# Patient Record
Sex: FEMALE | Race: OTHER | HISPANIC OR LATINO | URBAN - METROPOLITAN AREA
[De-identification: names, ages, dates, MRNs, and addresses within clinical notes are randomized per-mention and may not be internally consistent; named-entity substitution may affect disease eponyms.]

---

## 2020-10-28 ENCOUNTER — INPATIENT (INPATIENT)
Facility: HOSPITAL | Age: 85
LOS: 5 days | Discharge: HOME HEALTH | End: 2020-11-03
Attending: HOSPITALIST | Admitting: HOSPITALIST
Payer: MEDICARE

## 2020-10-28 VITALS
SYSTOLIC BLOOD PRESSURE: 146 MMHG | OXYGEN SATURATION: 99 % | DIASTOLIC BLOOD PRESSURE: 86 MMHG | HEART RATE: 104 BPM | HEIGHT: 63 IN | TEMPERATURE: 98 F | RESPIRATION RATE: 16 BRPM

## 2020-10-28 DIAGNOSIS — R06.00 DYSPNEA, UNSPECIFIED: ICD-10-CM

## 2020-10-28 DIAGNOSIS — Z98.890 OTHER SPECIFIED POSTPROCEDURAL STATES: Chronic | ICD-10-CM

## 2020-10-28 DIAGNOSIS — D62 ACUTE POSTHEMORRHAGIC ANEMIA: ICD-10-CM

## 2020-10-28 DIAGNOSIS — M06.9 RHEUMATOID ARTHRITIS, UNSPECIFIED: ICD-10-CM

## 2020-10-28 DIAGNOSIS — Z02.9 ENCOUNTER FOR ADMINISTRATIVE EXAMINATIONS, UNSPECIFIED: ICD-10-CM

## 2020-10-28 DIAGNOSIS — K92.2 GASTROINTESTINAL HEMORRHAGE, UNSPECIFIED: ICD-10-CM

## 2020-10-28 DIAGNOSIS — Z29.9 ENCOUNTER FOR PROPHYLACTIC MEASURES, UNSPECIFIED: ICD-10-CM

## 2020-10-28 DIAGNOSIS — R74.02 ELEVATION OF LEVELS OF LACTIC ACID DEHYDROGENASE [LDH]: ICD-10-CM

## 2020-10-28 LAB
ALBUMIN SERPL ELPH-MCNC: 4.4 G/DL — SIGNIFICANT CHANGE UP (ref 3.3–5)
ALP SERPL-CCNC: 77 U/L — SIGNIFICANT CHANGE UP (ref 40–120)
ALT FLD-CCNC: 7 U/L — SIGNIFICANT CHANGE UP (ref 4–33)
ANION GAP SERPL CALC-SCNC: 11 MMO/L — SIGNIFICANT CHANGE UP (ref 7–14)
APTT BLD: 33.2 SEC — SIGNIFICANT CHANGE UP (ref 27–36.3)
AST SERPL-CCNC: 17 U/L — SIGNIFICANT CHANGE UP (ref 4–32)
BASE EXCESS BLDV CALC-SCNC: 5 MMOL/L — SIGNIFICANT CHANGE UP
BASOPHILS # BLD AUTO: 0.03 K/UL — SIGNIFICANT CHANGE UP (ref 0–0.2)
BASOPHILS NFR BLD AUTO: 0.4 % — SIGNIFICANT CHANGE UP (ref 0–2)
BILIRUB SERPL-MCNC: 0.3 MG/DL — SIGNIFICANT CHANGE UP (ref 0.2–1.2)
BLD GP AB SCN SERPL QL: NEGATIVE — SIGNIFICANT CHANGE UP
BLOOD GAS VENOUS - CREATININE: 0.58 MG/DL — SIGNIFICANT CHANGE UP (ref 0.5–1.3)
BUN SERPL-MCNC: 14 MG/DL — SIGNIFICANT CHANGE UP (ref 7–23)
CALCIUM SERPL-MCNC: 9.4 MG/DL — SIGNIFICANT CHANGE UP (ref 8.4–10.5)
CHLORIDE BLDV-SCNC: 104 MMOL/L — SIGNIFICANT CHANGE UP (ref 96–108)
CHLORIDE SERPL-SCNC: 102 MMOL/L — SIGNIFICANT CHANGE UP (ref 98–107)
CO2 SERPL-SCNC: 30 MMOL/L — SIGNIFICANT CHANGE UP (ref 22–31)
CREAT SERPL-MCNC: 0.57 MG/DL — SIGNIFICANT CHANGE UP (ref 0.5–1.3)
EOSINOPHIL # BLD AUTO: 0.02 K/UL — SIGNIFICANT CHANGE UP (ref 0–0.5)
EOSINOPHIL NFR BLD AUTO: 0.3 % — SIGNIFICANT CHANGE UP (ref 0–6)
GAS PNL BLDV: 141 MMOL/L — SIGNIFICANT CHANGE UP (ref 136–146)
GLUCOSE BLDV-MCNC: 100 MG/DL — HIGH (ref 70–99)
GLUCOSE SERPL-MCNC: 104 MG/DL — HIGH (ref 70–99)
HCO3 BLDV-SCNC: 27 MMOL/L — SIGNIFICANT CHANGE UP (ref 20–27)
HCT VFR BLD CALC: 29.8 % — LOW (ref 34.5–45)
HCT VFR BLD CALC: 31.4 % — LOW (ref 34.5–45)
HCT VFR BLDV CALC: 31.5 % — LOW (ref 34.5–45)
HGB BLD-MCNC: 9.3 G/DL — LOW (ref 11.5–15.5)
HGB BLD-MCNC: 9.6 G/DL — LOW (ref 11.5–15.5)
HGB BLDV-MCNC: 10.2 G/DL — LOW (ref 11.5–15.5)
IMM GRANULOCYTES NFR BLD AUTO: 0.4 % — SIGNIFICANT CHANGE UP (ref 0–1.5)
INR BLD: 1.06 — SIGNIFICANT CHANGE UP (ref 0.88–1.16)
LACTATE BLDV-MCNC: 2.2 MMOL/L — HIGH (ref 0.5–2)
LYMPHOCYTES # BLD AUTO: 2.18 K/UL — SIGNIFICANT CHANGE UP (ref 1–3.3)
LYMPHOCYTES # BLD AUTO: 31.6 % — SIGNIFICANT CHANGE UP (ref 13–44)
MCHC RBC-ENTMCNC: 23.5 PG — LOW (ref 27–34)
MCHC RBC-ENTMCNC: 24.2 PG — LOW (ref 27–34)
MCHC RBC-ENTMCNC: 30.6 % — LOW (ref 32–36)
MCHC RBC-ENTMCNC: 31.2 % — LOW (ref 32–36)
MCV RBC AUTO: 76.8 FL — LOW (ref 80–100)
MCV RBC AUTO: 77.6 FL — LOW (ref 80–100)
MONOCYTES # BLD AUTO: 0.75 K/UL — SIGNIFICANT CHANGE UP (ref 0–0.9)
MONOCYTES NFR BLD AUTO: 10.9 % — SIGNIFICANT CHANGE UP (ref 2–14)
NEUTROPHILS # BLD AUTO: 3.89 K/UL — SIGNIFICANT CHANGE UP (ref 1.8–7.4)
NEUTROPHILS NFR BLD AUTO: 56.4 % — SIGNIFICANT CHANGE UP (ref 43–77)
NRBC # FLD: 0 K/UL — SIGNIFICANT CHANGE UP (ref 0–0)
NRBC # FLD: 0 K/UL — SIGNIFICANT CHANGE UP (ref 0–0)
NT-PROBNP SERPL-SCNC: 133.4 PG/ML — SIGNIFICANT CHANGE UP
OB PNL STL: POSITIVE — SIGNIFICANT CHANGE UP
PCO2 BLDV: 55 MMHG — HIGH (ref 41–51)
PH BLDV: 7.36 PH — SIGNIFICANT CHANGE UP (ref 7.32–7.43)
PLATELET # BLD AUTO: 262 K/UL — SIGNIFICANT CHANGE UP (ref 150–400)
PLATELET # BLD AUTO: 290 K/UL — SIGNIFICANT CHANGE UP (ref 150–400)
PMV BLD: 10.6 FL — SIGNIFICANT CHANGE UP (ref 7–13)
PMV BLD: 10.8 FL — SIGNIFICANT CHANGE UP (ref 7–13)
PO2 BLDV: 22 MMHG — LOW (ref 35–40)
POTASSIUM BLDV-SCNC: 5.4 MMOL/L — HIGH (ref 3.4–4.5)
POTASSIUM SERPL-MCNC: 4.2 MMOL/L — SIGNIFICANT CHANGE UP (ref 3.5–5.3)
POTASSIUM SERPL-SCNC: 4.2 MMOL/L — SIGNIFICANT CHANGE UP (ref 3.5–5.3)
PROT SERPL-MCNC: 7.7 G/DL — SIGNIFICANT CHANGE UP (ref 6–8.3)
PROTHROM AB SERPL-ACNC: 12 SEC — SIGNIFICANT CHANGE UP (ref 10.6–13.6)
RBC # BLD: 3.84 M/UL — SIGNIFICANT CHANGE UP (ref 3.8–5.2)
RBC # BLD: 4.09 M/UL — SIGNIFICANT CHANGE UP (ref 3.8–5.2)
RBC # FLD: 14.6 % — HIGH (ref 10.3–14.5)
RBC # FLD: 14.8 % — HIGH (ref 10.3–14.5)
RH IG SCN BLD-IMP: POSITIVE — SIGNIFICANT CHANGE UP
RH IG SCN BLD-IMP: POSITIVE — SIGNIFICANT CHANGE UP
SAO2 % BLDV: 29.7 % — LOW (ref 60–85)
SODIUM SERPL-SCNC: 143 MMOL/L — SIGNIFICANT CHANGE UP (ref 135–145)
WBC # BLD: 6.6 K/UL — SIGNIFICANT CHANGE UP (ref 3.8–10.5)
WBC # BLD: 6.9 K/UL — SIGNIFICANT CHANGE UP (ref 3.8–10.5)
WBC # FLD AUTO: 6.6 K/UL — SIGNIFICANT CHANGE UP (ref 3.8–10.5)
WBC # FLD AUTO: 6.9 K/UL — SIGNIFICANT CHANGE UP (ref 3.8–10.5)

## 2020-10-28 PROCEDURE — 71046 X-RAY EXAM CHEST 2 VIEWS: CPT | Mod: 26

## 2020-10-28 PROCEDURE — 99223 1ST HOSP IP/OBS HIGH 75: CPT

## 2020-10-28 PROCEDURE — 99284 EMERGENCY DEPT VISIT MOD MDM: CPT

## 2020-10-28 RX ORDER — PANTOPRAZOLE SODIUM 20 MG/1
8 TABLET, DELAYED RELEASE ORAL
Qty: 80 | Refills: 0 | Status: DISCONTINUED | OUTPATIENT
Start: 2020-10-28 | End: 2020-10-29

## 2020-10-28 RX ORDER — ACETAMINOPHEN 500 MG
2 TABLET ORAL
Qty: 0 | Refills: 0 | DISCHARGE

## 2020-10-28 RX ORDER — PETROLATUM,WHITE
1 JELLY (GRAM) TOPICAL
Qty: 0 | Refills: 0 | DISCHARGE

## 2020-10-28 RX ORDER — FUROSEMIDE 40 MG
1 TABLET ORAL
Qty: 0 | Refills: 0 | DISCHARGE

## 2020-10-28 RX ORDER — PETROLATUM,WHITE
1 JELLY (GRAM) TOPICAL DAILY
Refills: 0 | Status: DISCONTINUED | OUTPATIENT
Start: 2020-10-28 | End: 2020-11-03

## 2020-10-28 RX ORDER — FUROSEMIDE 40 MG
40 TABLET ORAL DAILY
Refills: 0 | Status: DISCONTINUED | OUTPATIENT
Start: 2020-10-28 | End: 2020-10-28

## 2020-10-28 RX ORDER — CHOLECALCIFEROL (VITAMIN D3) 125 MCG
1000 CAPSULE ORAL DAILY
Refills: 0 | Status: DISCONTINUED | OUTPATIENT
Start: 2020-10-28 | End: 2020-10-29

## 2020-10-28 RX ORDER — SODIUM CHLORIDE 9 MG/ML
3 INJECTION INTRAMUSCULAR; INTRAVENOUS; SUBCUTANEOUS EVERY 8 HOURS
Refills: 0 | Status: DISCONTINUED | OUTPATIENT
Start: 2020-10-28 | End: 2020-11-03

## 2020-10-28 RX ORDER — CHOLECALCIFEROL (VITAMIN D3) 125 MCG
1 CAPSULE ORAL
Qty: 0 | Refills: 0 | DISCHARGE

## 2020-10-28 RX ORDER — ACETAMINOPHEN 500 MG
650 TABLET ORAL EVERY 6 HOURS
Refills: 0 | Status: DISCONTINUED | OUTPATIENT
Start: 2020-10-28 | End: 2020-11-03

## 2020-10-28 RX ADMIN — SODIUM CHLORIDE 3 MILLILITER(S): 9 INJECTION INTRAMUSCULAR; INTRAVENOUS; SUBCUTANEOUS at 21:23

## 2020-10-28 RX ADMIN — PANTOPRAZOLE SODIUM 10 MG/HR: 20 TABLET, DELAYED RELEASE ORAL at 20:41

## 2020-10-28 NOTE — H&P ADULT - ASSESSMENT
88F with history of R.A. admitted for lower GI bleed.   88F with history of R.A. admitted for GI bleed, upper vs lower.    87yo female, ambulates with a cane, LE edema, history of rheumatoid arthritis (not on steroids or DMARD) a/w likely upper GI bleed c/b symptomatic anemia;

## 2020-10-28 NOTE — H&P ADULT - NSHPLABSRESULTS_GEN_ALL_CORE
9.6    6.90  )-----------( 290      ( 28 Oct 2020 17:28 )             31.4   10-28    143  |  102  |  14  ----------------------------<  104<H>  4.2   |  30  |  0.57    Ca    9.4      28 Oct 2020 17:28    TPro  7.7  /  Alb  4.4  /  TBili  0.3  /  DBili  x   /  AST  17  /  ALT  7   /  AlkPhos  77  10-28    Occult blood= Positive  Pt/INR/ PTT= 12.0/ 1.06/ 33.2.  Lactate= 2.2.  PH = 7.36. 9.6    6.90  )-----------( 290      ( 28 Oct 2020 17:28 )             31.4   10-28    143  |  102  |  14  ----------------------------<  104<H>  4.2   |  30  |  0.57    Ca    9.4      28 Oct 2020 17:28    TPro  7.7  /  Alb  4.4  /  TBili  0.3  /  DBili  x   /  AST  17  /  ALT  7   /  AlkPhos  77  10-28    Occult blood= Positive  Pt/INR/ PTT= 12.0/ 1.06/ 33.2.  Lactate= 2.2.  PH = 7.36.    CXR: clear lungs, no pleural effusions - my reading CXR: clear lungs, no pleural effusions - my reading    EKG, 10/29, nsr 76bpm qtc 454, no acute Tw or ST changes - my reading      9.6    6.90  )-----------( 290      ( 28 Oct 2020 17:28 )             31.4   10-28    143  |  102  |  14  ----------------------------<  104<H>  4.2   |  30  |  0.57    Ca    9.4      28 Oct 2020 17:28    TPro  7.7  /  Alb  4.4  /  TBili  0.3  /  DBili  x   /  AST  17  /  ALT  7   /  AlkPhos  77  10-28    Occult blood= Positive  Pt/INR/ PTT= 12.0/ 1.06/ 33.2.  Lactate= 2.2.  PH = 7.36.

## 2020-10-28 NOTE — H&P ADULT - PROBLEM SELECTOR PROBLEM 3
Rheumatoid arthritis SR (dyspnea on exertion) Anemia due to acute blood loss Elevated lactic acid level

## 2020-10-28 NOTE — H&P ADULT - RS GEN PE MLT RESP DETAILS PC
no wheezes/airway patent/good air movement/no intercostal retractions/no chest wall tenderness/respirations non-labored/no rhonchi/no rales/no subcutaneous emphysema/clear to auscultation bilaterally/breath sounds equal

## 2020-10-28 NOTE — ED PROVIDER NOTE - ATTENDING CONTRIBUTION TO CARE
I have seen and examined the patient on the patient´s visit date. I have reviewed the note written by [facility], on that visit day. I have supervised and participated as necessary in the performance of procedures indicated for patient management and was available at all phases of the patient´s visit when needed. We discussed the history, physical exam findings, management plan, and  medical decision making. I have made my additions, exceptions, and revisions within the chart and I agree with H and P as documented in its entirety. The data and my interpretation of any data collected from labs, interventions and imaging appear below as well as my independent medical decision making and considerations    88F who PTED with BRBPR time w/ several BM's (3 today) sent from  for same and tachycardia. Patient has had a negative scope polyps/diverticulosis; 5-10 yr ago. No constitutional s/s or chest pain dizziness diaphoresis  VSS slightly tachycardi  PE: as described; my additions and exceptions are noted in the chart; guiac red stool +   LABS see pullset of significant labs  IMP: Acute on chroinic GI bleed (low MCV and relative HD stability)) with no coags on no AC proabables include diverticular, AVM's bc of anemia malignancy not off the table   Plan  continue serial CBC's would tx if H/H drops <7.5; GI consult  Admit I have seen and examined the patient on the patient´s visit date. I have reviewed the note written by [facility], on that visit day. I have supervised and participated as necessary in the performance of procedures indicated for patient management and was available at all phases of the patient´s visit when needed. We discussed the history, physical exam findings, management plan, and  medical decision making. I have made my additions, exceptions, and revisions within the chart and I agree with H and P as documented in its entirety. The data and my interpretation of any data collected from labs, interventions and imaging appear below as well as my independent medical decision making and considerations    88F who PTED with BRBPR time w/ several BM's (3 today) sent from  for same and tachycardia. Patient has had a negative scope polyps/diverticulosis; 5-10 yr ago. No constitutional s/s or chest pain dizziness diaphoresis  VSS slightly tachycardi  PE: as described; my additions and exceptions are noted in the chart; guiac red stool +   LABS see pullset of significant labs  IMP: Acute on chroinic GI bleed, probably lower source (low MCV and relative HD stability)) with no coags on no AC proabables include diverticular, AVM's bc of anemia malignancy not off the table   Plan  continue serial CBC's would tx if H/H drops <7.5; GI consult  Admit

## 2020-10-28 NOTE — H&P ADULT - NEGATIVE NEUROLOGICAL SYMPTOMS
no focal seizures/no paresthesias/no generalized seizures/no loss of consciousness/no hemiparesis/no transient paralysis/no facial palsy/no vertigo/no headache/no confusion/no weakness/no tremors/no loss of sensation/no syncope

## 2020-10-28 NOTE — H&P ADULT - PROBLEM SELECTOR PLAN 7
1.  Name of PCP: Ravi Wise   2.  PCP Contacted on Admission: [ ] Y    [X] N    3.  PCP contacted at Discharge: [ ] Y    [ ] N    [ ] N/A  4.  Post-Discharge Appointment Date and Location:  5.  Summary of Handoff given to PCP: VTE with B/L Venodyne.  Fall, Aspiration, safety, seizure precautions. VTE with B/L Venodyne; No Heparin sq DVT due to active GI bleed   Fall, Aspiration, safety, seizure precautions.

## 2020-10-28 NOTE — ED ADULT NURSE REASSESSMENT NOTE - NS ED NURSE REASSESS COMMENT FT1
Report given to ESSU 1 RN. Pt at baseline mental status & in stable condition. RR even and unlabored. No active bleeding noted from rectum at this time. Pt denies CP, SOB, abdominal pain. Pt brought over to ESSU 1 at this time.

## 2020-10-28 NOTE — H&P ADULT - PROBLEM SELECTOR PLAN 4
VTE with B/L Venodyne.  Fall, Aspiration, safety, seizure precautions. A Febrile.  F/U UA & CXR. likely due symptomatic anemia   F/U CXR  ProBNP = 133.4. Less likely fluid overload.   Lasix on hold due to SBP =112 per MD.  Monitor Lytes. Likely due symptomatic anemia; Low   EKG pending  CXR: clear lungs - my reading   f/u official CXR report   Pro BNP = 133.4, low suspicion of CHF or ACS  Lasix on hold due to active GIB and soft SBP  Screening Trops and TSH added on Likely due symptomatic anemia; Low suspicion of ACS;  EKG: WNL  CXR: clear lungs - my reading   f/u official CXR report   Pro BNP = 133.4, low suspicion of CHF or ACS  Lasix on hold due to active GIB and soft SBP  Screening Trops and TSH added on

## 2020-10-28 NOTE — H&P ADULT - PROBLEM SELECTOR PLAN 3
Currently not on any disease suppression medications.   Tylenol PRN pain. likely due to fluid overload.  F/U CXR  Continue Lasix 40 mg po daily.  F/U TTE.  Monitor Lytes. likely due to fluid overload.  F/U CXR, ProBNP.  Lasix on hold for now due to / 70.     F/U TTE.  Monitor Lytes. likely due to fluid overload.  F/U CXR, ProBNP.  Lasix on hold for now due to / 70.   F/U TTE.  Monitor Lytes. likely due symptomatic anemia   F/U CXR  ProBNP = 133.4. Less likely fluid overload.   Lasix on hold due to SBP =112 per MD.  Monitor Lytes. F/U CBC.   T & S done by the Ed.  Most recent H & H = 9.6/ 31.4.  F/U CBC.  Consider transfusion if hemoglobin < 7.  Blood transfusion consent signed and placed in the chart. Unclear etiology: No Abd pain;  -Repeat VBG in AM   -Plan as above

## 2020-10-28 NOTE — H&P ADULT - NSHPSOCIALHISTORY_GEN_ALL_CORE
,  Lives with family.  ETOH: wine, 1 glass< 1 x a month.  Denies Nicotine.  Denies Illicit / recreational drug use.  Retired [employer] employee.  Last Colonoscopy : 2010 : Normal.  Last Mammogram 2015: Normal.  Flu Vax :9/2020   Lives with family.  ETOH: wine, 1 glass< 1 x a month.  Denies Nicotine.  Denies Illicit / recreational drug use.  Retired bank employee.  Last Colonoscopy : 2010 : Normal.  Last Mammogram 2015: Normal.  Flu Vax :9/2020

## 2020-10-28 NOTE — PHARMACOTHERAPY INTERVENTION NOTE - COMMENTS
Medication history is complete. Medication list updated in Outpatient Medication Record (OMR). Please call spectra [phone] if you have any questions.

## 2020-10-28 NOTE — ED ADULT NURSE NOTE - OBJECTIVE STATEMENT
Pt arrived to room 5 A&Ox4 ambulatory with a cane at baseline c/o bloody stool x 1 day. PMHx anemia. RR even and unlabored, pallor/diaphoresis not noted. Pt NSR on cardiac monitor. Abdomen nondistended, nontender. Skin dry and intact. Edema +1 noted to BLE. Pt denies CP, SOB, fatigue, HA, N/V, dizziness, cough, fever, use of blood thinners. IV established with 20G in LAC. Labs drawn and sent. VSS and as noted. MD at bedside, will continue to monitor.

## 2020-10-28 NOTE — H&P ADULT - HISTORY OF PRESENT ILLNESS
88F, ambulates with a cane, history of Rheumatoid arthritis, not on steroids or any disease suppression medications, experiencing 6 episodes of dark, red bloody stools during bowel movements since 10/27. Constant sensation of needing to defecate, SR after 1 block, last BM 10/28/2020 @ 20:15, with consider a lot of blood. Denies nausea, vomit, abdominal pain, dizziness, HA, recent falls, chest pain, palpations, chills, diaphoresis, dysuria, cough. has never experienced an episode like this in the past. Has benitoe had a blood transfusion.    In the ED, H & H =9.6/ 31.4. Occult positive.   Vitals: T max 98.2 forehead, HR= 97b/ min, BP = 147/ 76, RR= 16b/ min, SPO2= 100% ra 88F, ambulates with a cane, history of Rheumatoid arthritis, not on steroids or any disease suppression medications, experiencing 6 episodes of dark, red bloody stools during bowel movements since 10/27. Constant sensation of needing to defecate, SR after 1 block, does not monitor salt intake, last BM 10/28/2020 @ 20:15, with consider a lot of blood. Denies nausea, vomit, abdominal pain, dizziness, HA, recent falls, chest pain, palpations, chills, diaphoresis, dysuria, cough. has never experienced an episode like this in the past. Has neve had a blood transfusion.    In the ED, H & H =9.6/ 31.4. Occult positive.   Vitals: T max 98.2 forehead, HR= 97b/ min, BP = 147/ 76, RR= 16b/ min, SPO2= 100% ra 89yo female, ambulates with a cane, history of Rheumatoid arthritis, not on steroids or any disease suppression medications, experiencing 6 episodes of dark, red bloody stools during bowel movements since 10/27. Constant sensation of needing to defecate, SR after 1 block, does not monitor salt intake, last BM 10/28/2020 @ 20:15, with "a lot of blood." Reports no nausea, vomiting, abdominal pain, dizziness, HA, recent falls, chest pain, palpations, chills, diaphoresis, dysuria, cough. Has never experienced an episode like this in the past. Has never had a blood transfusion.    ED course, H & H =9.6/ 31.4. Occult positive.   Vitals: T max 98.2 forehead, HR= 97b/ min, BP = 147/ 76, RR= 16b/ min, SPO2= 100% ra 87yo female, ambulates with a cane, LE edema, history of rheumatoid arthritis (not on steroids or DMARD), experiencing 6 episodes of dark, red bloody stools during bowel movements since 10/27. Constant sensation of needing to defecate, SR after 1 block, does not monitor salt intake, last BM 10/28/2020 @ 20:15, with "a lot of blood." Reports no nausea, vomiting, abdominal pain, dizziness, HA, recent falls, chest pain, palpations, chills, diaphoresis, dysuria, cough. Has never experienced an episode like this in the past. Has never had a blood transfusion.    ED course, H & H =9.6/ 31.4. Occult blood positive. ESSU1: large melanotic BM, estimated ~500ml volume   Vitals: T max 98.2 forehead, HR= 97b/ min, BP = 147/ 76, RR= 16b/ min, SPO2= 100% RA

## 2020-10-28 NOTE — H&P ADULT - ADDITIONAL PE
Rectal exam as per ED, re-examination not indicated at this time:  -No external hemorrhoids or fissures noted. Maroon colored stool on NEVIN. NEVIN exam as per ED, re-examination not indicated at this time:  -No external hemorrhoids or fissures noted. Maroon colored stool;

## 2020-10-28 NOTE — H&P ADULT - PROBLEM SELECTOR PLAN 1
Occult positive stools.  NPO except meds.   GI consult for likely scoping, emailed.  On Protonix IV. Occult positive stools.  NPO except meds.   GI consult for possible EGD vs scoping, emailed.  On Protonix IV. Large, appx 500ml melanotic loose stool observed in ESSU 1;  BUN elevated  Occult blood (+)  Strict NPO  Hold all non-essential oral medications   Protonix gtt  Hgb 9.6-->9.3  Monitor Hgb q6h  Given large melena will transfuse 1 unit of PRBC now  GI c/s for EGD (requested by PA)  Bedrest, strict

## 2020-10-28 NOTE — H&P ADULT - PROBLEM SELECTOR PLAN 2
F/U CBC.   T & S done by the Ed.  Most recent H & H = 9.6/ 31.4.  F/U CBC.  Consider transfusion if hemoglobin < 7.  Blood transfusion consent signed and placed in the chart. Occult positive stools.  NPO except meds.   GI consult for possible EGD vs scoping, emailed.  On Protonix IV.

## 2020-10-28 NOTE — ED PROVIDER NOTE - CLINICAL SUMMARY MEDICAL DECISION MAKING FREE TEXT BOX
87 y/o female with PMH of arthritis and B/L lower leg edema presents with bloody bowel movements x2 days  pt with maroon colored stool on NEVIN, BP stable, well appearing, belly soft, non-tender  check labs, admit pt for potential colonoscopy

## 2020-10-28 NOTE — H&P ADULT - ENMT COMMENTS
[facility] Emergency Department Call Back     Hello,    This is Chandni Wolf RN calling from [facility] regarding you recent visit. If you have any questions or concerns, please call the Emergency Department back at Dept: [phone].  If not, please disregard this message and have a great day.      absent teeth.

## 2020-10-28 NOTE — H&P ADULT - NEGATIVE OPHTHALMOLOGIC SYMPTOMS
no lacrimation L/no lacrimation R/no discharge L/no blurred vision L/no blurred vision R/no photophobia/no discharge R

## 2020-10-28 NOTE — H&P ADULT - PROBLEM SELECTOR PLAN 5
1.  Name of PCP: Ravi Wise   2.  PCP Contacted on Admission: [ ] Y    [X] N    3.  PCP contacted at Discharge: [ ] Y    [ ] N    [ ] N/A  4.  Post-Discharge Appointment Date and Location:  5.  Summary of Handoff given to PCP: Currently not on any disease suppression medications.   Tylenol PRN pain. A Febrile.  F/U UA & CXR. afebril  F/U UA & CXR.

## 2020-10-28 NOTE — H&P ADULT - HEIGHT IN INCHES
----- Message from Sandra Saravia MD sent at 4/23/2019 11:00 AM CDT -----  Please call patient with normal cologuard result or send letter  
Call to patient, results given. She voiced understanding.   
3

## 2020-10-28 NOTE — ED ADULT NURSE NOTE - INTERVENTIONS DEFINITIONS
Physically safe environment: no spills, clutter or unnecessary equipment/[place] to call system/Non-slip footwear when patient is off stretcher/Monitor for mental status changes and reorient to person, place, and time/Call bell, personal items and telephone within reach/Instruct patient to call for assistance/Monitor gait and stability/Stretcher in lowest position, wheels locked, appropriate side rails in place

## 2020-10-28 NOTE — H&P ADULT - GASTROINTESTINAL DETAILS
soft/nontender/no rigidity/no rebound tenderness/no masses palpable/bowel sounds normal/no bruit/no guarding

## 2020-10-28 NOTE — ED PROVIDER NOTE - CROS ED NEURO ALL NEG
96093 US Chest (PTX, Pleural Effussion/CHF vs COPD)/37971 Echocardiography Transthoracic with Image 2D (Echo/FAST)
negative...

## 2020-10-28 NOTE — ED ADULT TRIAGE NOTE - CHIEF COMPLAINT QUOTE
Sent from urgent care for eval. Pt. c/o bloody stools since yesterday. States blood is dark red. No use of blood thinners. Denies abdominal pain, n/v/d.

## 2020-10-28 NOTE — ED PROVIDER NOTE - OBJECTIVE STATEMENT
87 y/o female with PMH of arthritis and B/L lower leg edema presents with bloody bowel movements x2 days. Pt states she had 2 episodes last night and 3 today. She describes it as dark red blood with clots seen when she wipes and also seen in the toilet. Her last colonoscopy was 5-10 years ago and showed polyps as per pt. ? hx of diverticulosis/diverticulitis. Denies fever, chills, abdominal pain, N/V/D, dizziness, syncope.

## 2020-10-28 NOTE — H&P ADULT - PROBLEM SELECTOR PLAN 8
1.  Name of PCP: Ravi Wise   2.  PCP Contacted on Admission: [ ] Y    [X] N    3.  PCP contacted at Discharge: [ ] Y    [ ] N    [ ] N/A  4.  Post-Discharge Appointment Date and Location:  5.  Summary of Handoff given to PCP:

## 2020-10-28 NOTE — H&P ADULT - MUSCULOSKELETAL
details… detailed exam no calf tenderness/normal strength/no joint warmth/no joint swelling/no joint erythema

## 2020-10-28 NOTE — H&P ADULT - PROBLEM SELECTOR PLAN 6
VTE with B/L Venodyne.  Fall, Aspiration, safety, seizure precautions. Currently not on any disease suppression medications.   Tylenol PRN pain. VTE with B/L Venodyne; No Heparin sq DVT due to active GI bleed   Fall, Aspiration, safety, seizure precautions.

## 2020-10-29 DIAGNOSIS — R79.89 OTHER SPECIFIED ABNORMAL FINDINGS OF BLOOD CHEMISTRY: ICD-10-CM

## 2020-10-29 DIAGNOSIS — K92.1 MELENA: ICD-10-CM

## 2020-10-29 DIAGNOSIS — K92.2 GASTROINTESTINAL HEMORRHAGE, UNSPECIFIED: ICD-10-CM

## 2020-10-29 LAB
ANION GAP SERPL CALC-SCNC: 13 MMO/L — SIGNIFICANT CHANGE UP (ref 7–14)
BASE EXCESS BLDV CALC-SCNC: 3.8 MMOL/L — SIGNIFICANT CHANGE UP
BLOOD GAS VENOUS - CREATININE: 0.61 MG/DL — SIGNIFICANT CHANGE UP (ref 0.5–1.3)
BUN SERPL-MCNC: 16 MG/DL — SIGNIFICANT CHANGE UP (ref 7–23)
CALCIUM SERPL-MCNC: 8.8 MG/DL — SIGNIFICANT CHANGE UP (ref 8.4–10.5)
CHLORIDE BLDV-SCNC: 106 MMOL/L — SIGNIFICANT CHANGE UP (ref 96–108)
CHLORIDE SERPL-SCNC: 104 MMOL/L — SIGNIFICANT CHANGE UP (ref 98–107)
CK MB BLD-MCNC: 1.6 NG/ML — SIGNIFICANT CHANGE UP (ref 1–4.7)
CK MB BLD-MCNC: SIGNIFICANT CHANGE UP (ref 0–2.5)
CK SERPL-CCNC: 53 U/L — SIGNIFICANT CHANGE UP (ref 25–170)
CO2 SERPL-SCNC: 24 MMOL/L — SIGNIFICANT CHANGE UP (ref 22–31)
CREAT SERPL-MCNC: 0.57 MG/DL — SIGNIFICANT CHANGE UP (ref 0.5–1.3)
GAS PNL BLDV: 143 MMOL/L — SIGNIFICANT CHANGE UP (ref 136–146)
GLUCOSE BLDV-MCNC: 109 MG/DL — HIGH (ref 70–99)
GLUCOSE SERPL-MCNC: 184 MG/DL — HIGH (ref 70–99)
HCO3 BLDV-SCNC: 26 MMOL/L — SIGNIFICANT CHANGE UP (ref 20–27)
HCT VFR BLD CALC: 27.4 % — LOW (ref 34.5–45)
HCT VFR BLD CALC: 29.8 % — LOW (ref 34.5–45)
HCT VFR BLDV CALC: 30.3 % — LOW (ref 34.5–45)
HGB BLD-MCNC: 8.3 G/DL — LOW (ref 11.5–15.5)
HGB BLD-MCNC: 9.3 G/DL — LOW (ref 11.5–15.5)
HGB BLDV-MCNC: 9.8 G/DL — LOW (ref 11.5–15.5)
LACTATE BLDV-MCNC: 2 MMOL/L — SIGNIFICANT CHANGE UP (ref 0.5–2)
MAGNESIUM SERPL-MCNC: 2.2 MG/DL — SIGNIFICANT CHANGE UP (ref 1.6–2.6)
MCHC RBC-ENTMCNC: 24.1 PG — LOW (ref 27–34)
MCHC RBC-ENTMCNC: 24.5 PG — LOW (ref 27–34)
MCHC RBC-ENTMCNC: 30.3 % — LOW (ref 32–36)
MCHC RBC-ENTMCNC: 31.2 % — LOW (ref 32–36)
MCV RBC AUTO: 78.6 FL — LOW (ref 80–100)
MCV RBC AUTO: 79.4 FL — LOW (ref 80–100)
NRBC # FLD: 0 K/UL — SIGNIFICANT CHANGE UP (ref 0–0)
NRBC # FLD: 0.02 K/UL — SIGNIFICANT CHANGE UP (ref 0–0)
PCO2 BLDV: 54 MMHG — HIGH (ref 41–51)
PH BLDV: 7.35 PH — SIGNIFICANT CHANGE UP (ref 7.32–7.43)
PHOSPHATE SERPL-MCNC: 3.7 MG/DL — SIGNIFICANT CHANGE UP (ref 2.5–4.5)
PLATELET # BLD AUTO: 244 K/UL — SIGNIFICANT CHANGE UP (ref 150–400)
PLATELET # BLD AUTO: 260 K/UL — SIGNIFICANT CHANGE UP (ref 150–400)
PMV BLD: 11.1 FL — SIGNIFICANT CHANGE UP (ref 7–13)
PMV BLD: 11.4 FL — SIGNIFICANT CHANGE UP (ref 7–13)
PO2 BLDV: 25 MMHG — LOW (ref 35–40)
POTASSIUM BLDV-SCNC: 4.1 MMOL/L — SIGNIFICANT CHANGE UP (ref 3.4–4.5)
POTASSIUM SERPL-MCNC: 3.7 MMOL/L — SIGNIFICANT CHANGE UP (ref 3.5–5.3)
POTASSIUM SERPL-SCNC: 3.7 MMOL/L — SIGNIFICANT CHANGE UP (ref 3.5–5.3)
RBC # BLD: 3.45 M/UL — LOW (ref 3.8–5.2)
RBC # BLD: 3.79 M/UL — LOW (ref 3.8–5.2)
RBC # FLD: 14.5 % — SIGNIFICANT CHANGE UP (ref 10.3–14.5)
RBC # FLD: 14.8 % — HIGH (ref 10.3–14.5)
SAO2 % BLDV: 39.9 % — LOW (ref 60–85)
SARS-COV-2 RNA SPEC QL NAA+PROBE: SIGNIFICANT CHANGE UP
SODIUM SERPL-SCNC: 141 MMOL/L — SIGNIFICANT CHANGE UP (ref 135–145)
TROPONIN T, HIGH SENSITIVITY: 10 NG/L — SIGNIFICANT CHANGE UP (ref ?–14)
WBC # BLD: 8.32 K/UL — SIGNIFICANT CHANGE UP (ref 3.8–10.5)
WBC # BLD: 9.8 K/UL — SIGNIFICANT CHANGE UP (ref 3.8–10.5)
WBC # FLD AUTO: 8.32 K/UL — SIGNIFICANT CHANGE UP (ref 3.8–10.5)
WBC # FLD AUTO: 9.8 K/UL — SIGNIFICANT CHANGE UP (ref 3.8–10.5)

## 2020-10-29 PROCEDURE — 99233 SBSQ HOSP IP/OBS HIGH 50: CPT

## 2020-10-29 PROCEDURE — 43235 EGD DIAGNOSTIC BRUSH WASH: CPT | Mod: GC

## 2020-10-29 PROCEDURE — 99223 1ST HOSP IP/OBS HIGH 75: CPT | Mod: GC,25

## 2020-10-29 RX ORDER — PANTOPRAZOLE SODIUM 20 MG/1
40 TABLET, DELAYED RELEASE ORAL EVERY 12 HOURS
Refills: 0 | Status: DISCONTINUED | OUTPATIENT
Start: 2020-10-29 | End: 2020-10-29

## 2020-10-29 RX ORDER — ACETAMINOPHEN 500 MG
650 TABLET ORAL ONCE
Refills: 0 | Status: COMPLETED | OUTPATIENT
Start: 2020-10-29 | End: 2020-10-29

## 2020-10-29 RX ORDER — DIPHENHYDRAMINE HCL 50 MG
25 CAPSULE ORAL ONCE
Refills: 0 | Status: COMPLETED | OUTPATIENT
Start: 2020-10-29 | End: 2020-10-29

## 2020-10-29 RX ORDER — SOD SULF/SODIUM/NAHCO3/KCL/PEG
2000 SOLUTION, RECONSTITUTED, ORAL ORAL ONCE
Refills: 0 | Status: COMPLETED | OUTPATIENT
Start: 2020-10-29 | End: 2020-10-29

## 2020-10-29 RX ORDER — SOD SULF/SODIUM/NAHCO3/KCL/PEG
4000 SOLUTION, RECONSTITUTED, ORAL ORAL ONCE
Refills: 0 | Status: DISCONTINUED | OUTPATIENT
Start: 2020-10-29 | End: 2020-10-29

## 2020-10-29 RX ADMIN — Medication 650 MILLIGRAM(S): at 01:43

## 2020-10-29 RX ADMIN — SODIUM CHLORIDE 3 MILLILITER(S): 9 INJECTION INTRAMUSCULAR; INTRAVENOUS; SUBCUTANEOUS at 21:01

## 2020-10-29 RX ADMIN — Medication 1 APPLICATION(S): at 14:00

## 2020-10-29 RX ADMIN — Medication 25 MILLIGRAM(S): at 01:43

## 2020-10-29 RX ADMIN — PANTOPRAZOLE SODIUM 10 MG/HR: 20 TABLET, DELAYED RELEASE ORAL at 06:00

## 2020-10-29 RX ADMIN — SODIUM CHLORIDE 3 MILLILITER(S): 9 INJECTION INTRAMUSCULAR; INTRAVENOUS; SUBCUTANEOUS at 05:27

## 2020-10-29 RX ADMIN — Medication 2000 MILLILITER(S): at 18:50

## 2020-10-29 RX ADMIN — Medication 10 MILLIGRAM(S): at 18:50

## 2020-10-29 RX ADMIN — SODIUM CHLORIDE 3 MILLILITER(S): 9 INJECTION INTRAMUSCULAR; INTRAVENOUS; SUBCUTANEOUS at 14:00

## 2020-10-29 NOTE — PROGRESS NOTE ADULT - PROBLEM SELECTOR PLAN 1
No active bleeding since yesterday  -s/p 1 unit pRBC with appropriate response. H/H stable  -c/w IV PPI BID  -GI following, plan for EGD today. f/u post-EGD recs

## 2020-10-29 NOTE — PROGRESS NOTE ADULT - SUBJECTIVE AND OBJECTIVE BOX
[facility] Division of Hospital Medicine  Carlos A Ferraro MD  Pager [phone]      Patient is a 88y old  Female who presents with a chief complaint of Bloody stools x 2 days (29 Oct 2020 07:43)      SUBJECTIVE / OVERNIGHT EVENTS:    No BM/melena since last night. Pt offers no new complaints. Denies dizziness, chest pain, sob, palpitation, abd pain.     ADDITIONAL REVIEW OF SYSTEMS:    RESPIRATORY: No cough, wheezing, chills or hemoptysis; No shortness of breath  CARDIOVASCULAR: No chest pain, palpitations, dizziness, or leg swelling  GASTROINTESTINAL: No abdominal or epigastric pain. No nausea, vomiting, or hematemesis; No diarrhea or constipation.       MEDICATIONS  (STANDING):  pantoprazole  Injectable 40 milliGRAM(s) IV Push every 12 hours  petrolatum white Ointment 1 Application(s) Topical daily  sodium chloride 0.9% lock flush 3 milliLiter(s) IV Push every 8 hours    MEDICATIONS  (PRN):  acetaminophen   Tablet .. 650 milliGRAM(s) Oral every 6 hours PRN Temp greater or equal to 38C (100.4F), Mild Pain (1 - 3), Moderate Pain (4 - 6)      CAPILLARY BLOOD GLUCOSE        I&O's Summary      PHYSICAL EXAM:  Vital Signs Last 24 Hrs  T(C): 36.7 (29 Oct 2020 13:18), Max: 37.1 (29 Oct 2020 01:43)  T(F): 98 (29 Oct 2020 13:18), Max: 98.8 (29 Oct 2020 01:43)  HR: 82 (29 Oct 2020 13:18) (79 - 104)  BP: 138/66 (29 Oct 2020 13:18) (98/62 - 150/80)  BP(mean): --  RR: 20 (29 Oct 2020 13:18) (16 - 20)  SpO2: 100% (29 Oct 2020 13:18) (99% - 100%)    CONSTITUTIONAL: NAD, well-developed, well-groomed  EYES: PERRLA; conjunctiva and sclera clear  ENMT: Moist oral mucosa, no pharyngeal injection or exudates;   NECK: Supple, no palpable masses;   RESPIRATORY: Normal respiratory effort; lungs are clear to auscultation bilaterally  CARDIOVASCULAR: Regular rate and rhythm, normal S1 and S2, no murmur/rub/gallop; No lower extremity edema; Peripheral pulses are 2+ bilaterally  ABDOMEN: Nontender to palpation, normoactive bowel sounds, no rebound/guarding; No hepatosplenomegaly  MUSCLOSKELETAL:  Normal gait; no clubbing or cyanosis of digits; no joint swelling or tenderness to palpation  PSYCH: A+O to person, place, and time; affect appropriate  NEUROLOGY: CN 2-12 are intact and symmetric; no gross sensory deficits;   SKIN: No rashes; no palpable lesions    LABS:                        9.3    8.32  )-----------( 244      ( 29 Oct 2020 06:37 )             29.8     10-29    141  |  104  |  16  ----------------------------<  184<H>  3.7   |  24  |  0.57    Ca    8.8      29 Oct 2020 01:09  Phos  3.7     10-29  Mg     2.2     10-29    TPro  7.7  /  Alb  4.4  /  TBili  0.3  /  DBili  x   /  AST  17  /  ALT  7   /  AlkPhos  77  10-28    PT/INR - ( 28 Oct 2020 17:28 )   PT: 12.0 SEC;   INR: 1.06          PTT - ( 28 Oct 2020 17:28 )  PTT:33.2 SEC  CARDIAC MARKERS ( 29 Oct 2020 01:09 )  x     / x     / 53 u/L / 1.60 ng/mL / x                RADIOLOGY & ADDITIONAL TESTS:  Results Reviewed:   Imaging Personally Reviewed:  Electrocardiogram Personally Reviewed:    COORDINATION OF CARE:  Care Discussed with Consultants/Other Providers [Y/N]:  Prior or Outpatient Records Reviewed [Y/N]:

## 2020-10-29 NOTE — CONSULT NOTE ADULT - ASSESSMENT
Impression:  1) Maroon colored stools- Differential diagnosis includes PUD, erosive gastropathy/esophagitis, angiectasia, malignancy, Dieulafoy's lesion, cannot rule out lower GI etiology as well such as      Impression:  1) Maroon colored stools- Differential diagnosis includes PUD, erosive gastropathy/esophagitis, angiectasia, malignancy, Dieulafoy's lesion, cannot rule out lower GI etiology as well such as diverticular bleed, angiectasia, CRC  2) RA    Recommendations:  -Plan for EGD today  -PPI IV BID  -Monitor CBC and BMs  -Keep NPO  -Rest of care per primary team    Nicholas Mederos, PGY6  Gastroenterology Fellow  Pager # [phone] / [phone]  Can be contacted via [employer] Teams     Impression:  1) Maroon colored stools with acute blood loss anemia- Differential diagnosis includes PUD, erosive gastropathy/esophagitis, angiectasia, malignancy, Dieulafoy's lesion, cannot rule out lower GI etiology as well such as diverticular bleed, angiectasia, CRC  2) RA    Recommendations:  -Plan for EGD today  -PPI IV BID  -Monitor CBC and BMs  -Keep NPO  -Rest of care per primary team    Nicholas Mederos, PGY6  Gastroenterology Fellow  Pager # [phone] / [phone]  Can be contacted via Microsoft Teams

## 2020-10-29 NOTE — CONSULT NOTE ADULT - SUBJECTIVE AND OBJECTIVE BOX
Chief Complaint:  Patient is a 88y old  Female who presents with a chief complaint of Bloody stools x 2 days (28 Oct 2020 20:31)      HPI:  Ms. Vega is an 89yo F with PMH of RA who presents with rectal bleeding.    For the past 3 days prior to admission patient reports recurrent episodes (x 6) of rectal bleeding, described as dark-red blood mixed with stools, no clots, associated with tenesmus. No abdominal pain, nausea, vomiting, constipation, diarrhea. No dysphagia. ***NSAIDS*** Denies chest pain, SOB, lightheadedness. No fever, chills, exposure to suspicious foods. No previous similar episodes.   Colonoscopy/endoscopy    In the ED: H & H =9.6/ 31.4. ESSU1: "large melanotic BM, estimated ~500ml volume"   Vitals: T max 98.2 forehead, HR= 97b/ min, BP = 147/ 76, RR= 16b/ min, SPO2= 100% RA       Allergies:  No Known Allergies      Home Medications:    Hospital Medications:  acetaminophen   Tablet .. 650 milliGRAM(s) Oral every 6 hours PRN  pantoprazole Infusion 8 mG/Hr IV Continuous <Continuous>  petrolatum white Ointment 1 Application(s) Topical daily  sodium chloride 0.9% lock flush 3 milliLiter(s) IV Push every 8 hours      PMHX/PSHX:  Rheumatoid arthritis    Arthritis    H/O Spinal surgery    H/O: hysterectomy        Family history:  No pertinent family history in first degree relatives        Denies family history of colon cancer/polyps, stomach cancer/polyps, pancreatic cancer/masses, liver cancer/disease, ovarian cancer and endometrial cancer.    Social History:     Tob: Denies  EtOH: Denies  Illicit Drugs: Denies    ROS:     General:  No wt loss, fevers, chills, night sweats, fatigue  Eyes:  Good vision, no reported pain  ENT:  No sore throat, pain, runny nose, dysphagia  CV:  No pain, palpitations, hypo/hypertension  Pulm:  No dyspnea, cough, tachypnea, wheezing  GI:  No pain, No nausea, No vomiting, No diarrhea, No constipation, No weight loss, No fever, No pruritis, No rectal bleeding, No tarry stools, No dysphagia,  :  No pain, bleeding, incontinence, nocturia  Muscle:  No pain, weakness  Neuro:  No weakness, tingling, memory problems  Psych:  No fatigue, insomnia, mood problems, depression  Endocrine:  No polyuria, polydipsia, cold/heat intolerance  Heme:  No petechiae, ecchymosis, easy bruisability  Skin:  No rash, tattoos, scars, edema    PHYSICAL EXAM:     GENERAL:  No acute distress  HEENT:  Normocephalic/atraumatic, no scleral icterus  CHEST:  Clear to auscultation bilaterally, no wheezes/rales/ronchi, no accessory muscle use  HEART:  Regular rate and rhythm, no murmurs/rubs/gallops  ABDOMEN:  Soft, non-tender, non-distended, normoactive bowel sounds,  no masses, no hepato-splenomegaly, no signs of chronic liver disease  EXTREMITIES: No cyanosis, clubbing, or edema  SKIN:  No rash/erythema/ecchymoses/petechiae/wounds/abscess/warm/dry  NEURO:  Alert and oriented x 3, no asterixis    Vital Signs:  Vital Signs Last 24 Hrs  T(C): 36.7 (29 Oct 2020 05:57), Max: 37.1 (29 Oct 2020 01:43)  T(F): 98 (29 Oct 2020 05:57), Max: 98.8 (29 Oct 2020 01:43)  HR: 84 (29 Oct 2020 05:57) (79 - 104)  BP: 144/89 (29 Oct 2020 05:57) (98/62 - 147/76)  BP(mean): --  RR: 18 (29 Oct 2020 05:57) (16 - 18)  SpO2: 100% (29 Oct 2020 05:57) (99% - 100%)  Daily Height in cm: 160.02 (29 Oct 2020 05:57)    Daily     LABS:                        8.3    9.80  )-----------( 260      ( 29 Oct 2020 01:00 )             27.4     Mean Cell Volume: 79.4 fL (10-29-20 @ 01:00)    10-29    141  |  104  |  16  ----------------------------<  184<H>  3.7   |  24  |  0.57    Ca    8.8      29 Oct 2020 01:09  Phos  3.7     10-29  Mg     2.2     10-29    TPro  7.7  /  Alb  4.4  /  TBili  0.3  /  DBili  x   /  AST  17  /  ALT  7   /  AlkPhos  77  10-28    LIVER FUNCTIONS - ( 28 Oct 2020 17:28 )  Alb: 4.4 g/dL / Pro: 7.7 g/dL / ALK PHOS: 77 u/L / ALT: 7 u/L / AST: 17 u/L / GGT: x           PT/INR - ( 28 Oct 2020 17:28 )   PT: 12.0 SEC;   INR: 1.06          PTT - ( 28 Oct 2020 17:28 )  PTT:33.2 SEC                            8.3    9.80  )-----------( 260      ( 29 Oct 2020 01:00 )             27.4                         9.3    6.60  )-----------( 262      ( 28 Oct 2020 21:08 )             29.8                         9.6    6.90  )-----------( 290      ( 28 Oct 2020 17:28 )             31.4       Imaging:             Chief Complaint:  Patient is a 88y old  Female who presents with a chief complaint of Bloody stools x 2 days (28 Oct 2020 20:31)      HPI:  Ms. Vega is an 87yo F with PMH of RA who presents with maroon colored stools. Pt states that for the past 3 days she has been having 3-4 episodes of maroon colored stools. Pt denies use of ASA, NSAIDs, other blood thinners. Pt had a colonoscopy 'a long time ago', was found to have polyps. No prior EGD in the past. Pt otherwise denies nausea, vomiting, abd pain, change in bowel habits, dysphagia. In the ED, Hgb is 9.3 (9 range in 2013). Pt takes Lasix for LE edema but denies any heart problems.     Allergies:  No Known Allergies      Home Medications:    Hospital Medications:  acetaminophen   Tablet .. 650 milliGRAM(s) Oral every 6 hours PRN  pantoprazole Infusion 8 mG/Hr IV Continuous <Continuous>  petrolatum white Ointment 1 Application(s) Topical daily  sodium chloride 0.9% lock flush 3 milliLiter(s) IV Push every 8 hours      PMHX/PSHX:  Rheumatoid arthritis    Arthritis    H/O Spinal surgery    H/O: hysterectomy        Family history:  No pertinent family history in first degree relatives        Denies family history of colon cancer/polyps, stomach cancer/polyps, pancreatic cancer/masses, liver cancer/disease, ovarian cancer and endometrial cancer.    Social History:     Tob: Denies  EtOH: Denies  Illicit Drugs: Denies    ROS:     General:  No wt loss, fevers, chills, night sweats, fatigue  Eyes:  Good vision, no reported pain  ENT:  No sore throat, pain, runny nose, dysphagia  CV:  No pain, palpitations, hypo/hypertension  Pulm:  No dyspnea, cough, tachypnea, wheezing  GI:  See HPI   :  No pain, bleeding, incontinence, nocturia  Muscle:  No pain, weakness  Neuro:  No weakness, tingling, memory problems  Psych:  No fatigue, insomnia, mood problems, depression  Endocrine:  No polyuria, polydipsia, cold/heat intolerance  Heme:  No petechiae, ecchymosis, easy bruisability  Skin:  No rash, tattoos, scars, edema    PHYSICAL EXAM:     GENERAL:  No acute distress  HEENT:  Normocephalic/atraumatic, no scleral icterus  CHEST:  Clear to auscultation bilaterally  HEART:  Regular rate and rhythm, no murmurs/rubs/gallops  ABDOMEN:  Soft, non-tender, non-distended, normoactive bowel sounds  EXTREMITIES: No cyanosis, clubbing, or edema  SKIN:  No rash/erythema/ecchymoses  NEURO:  Alert and oriented x 3, no asterixis  RECTAL: Maroon colored stool    Vital Signs:  Vital Signs Last 24 Hrs  T(C): 36.7 (29 Oct 2020 05:57), Max: 37.1 (29 Oct 2020 01:43)  T(F): 98 (29 Oct 2020 05:57), Max: 98.8 (29 Oct 2020 01:43)  HR: 84 (29 Oct 2020 05:57) (79 - 104)  BP: 144/89 (29 Oct 2020 05:57) (98/62 - 147/76)  BP(mean): --  RR: 18 (29 Oct 2020 05:57) (16 - 18)  SpO2: 100% (29 Oct 2020 05:57) (99% - 100%)  Daily Height in cm: 160.02 (29 Oct 2020 05:57)    Daily     LABS:                        8.3    9.80  )-----------( 260      ( 29 Oct 2020 01:00 )             27.4     Mean Cell Volume: 79.4 fL (10-29-20 @ 01:00)    10-29    141  |  104  |  16  ----------------------------<  184<H>  3.7   |  24  |  0.57    Ca    8.8      29 Oct 2020 01:09  Phos  3.7     10-29  Mg     2.2     10-29    TPro  7.7  /  Alb  4.4  /  TBili  0.3  /  DBili  x   /  AST  17  /  ALT  7   /  AlkPhos  77  10-28    LIVER FUNCTIONS - ( 28 Oct 2020 17:28 )  Alb: 4.4 g/dL / Pro: 7.7 g/dL / ALK PHOS: 77 u/L / ALT: 7 u/L / AST: 17 u/L / GGT: x           PT/INR - ( 28 Oct 2020 17:28 )   PT: 12.0 SEC;   INR: 1.06          PTT - ( 28 Oct 2020 17:28 )  PTT:33.2 SEC                            8.3    9.80  )-----------( 260      ( 29 Oct 2020 01:00 )             27.4                         9.3    6.60  )-----------( 262      ( 28 Oct 2020 21:08 )             29.8                         9.6    6.90  )-----------( 290      ( 28 Oct 2020 17:28 )             31.4       Imaging:             Chief Complaint:  Patient is a 88y old  Female who presents with a chief complaint of Bloody stools x 2 days (28 Oct 2020 20:31)      HPI:  Ms. Vega is an 89yo F with PMH of RA who presents with maroon colored stools. Pt states that for the past 3 days she has been having 3-4 episodes of maroon colored stools. Pt denies use of ASA, NSAIDs, other blood thinners. Pt had a colonoscopy 'a long time ago', was found to have polyps. No prior EGD in the past. Pt otherwise denies nausea, vomiting, abd pain, change in bowel habits, dysphagia. In the ED, Hgb is 9.3 (9 range in 2013). Pt takes Lasix for LE edema but denies any heart problems.     Allergies:  No Known Allergies      Home Medications:    Hospital Medications:  acetaminophen   Tablet .. 650 milliGRAM(s) Oral every 6 hours PRN  pantoprazole Infusion 8 mG/Hr IV Continuous <Continuous>  petrolatum white Ointment 1 Application(s) Topical daily  sodium chloride 0.9% lock flush 3 milliLiter(s) IV Push every 8 hours      PMHX/PSHX:    Rheumatoid arthritis  Arthritis  H/O Spinal surgery  H/O: hysterectomy      Family history:  No pertinent family history in first degree relatives  Denies family history of colon cancer/polyps, stomach cancer/polyps, pancreatic cancer/masses, liver cancer/disease, ovarian cancer and endometrial cancer.      Social History:   Tob: Denies  EtOH: Denies  Illicit Drugs: Denies      ROS:   General:  No wt loss, fevers, chills, night sweats, fatigue  Eyes:  Good vision, no reported pain  ENT:  No sore throat, pain, runny nose, dysphagia  CV:  No pain, palpitations, hypo/hypertension  Pulm:  No dyspnea, cough, tachypnea, wheezing  GI:  See HPI   :  No pain, bleeding, incontinence, nocturia  Muscle:  No pain, weakness  Neuro:  No weakness, tingling, memory problems  Psych:  No fatigue, insomnia, mood problems, depression  Endocrine:  No polyuria, polydipsia, cold/heat intolerance  Heme:  No petechiae, ecchymosis, easy bruisability  Skin:  No rash, tattoos, scars, edema      PHYSICAL EXAM:   GENERAL:  No acute distress  HEENT:  Normocephalic/atraumatic, no scleral icterus  NECK: supple  CHEST:  Clear to auscultation bilaterally  HEART:  Regular rate and rhythm, no murmurs/rubs/gallops  ABDOMEN:  Soft, non-tender, non-distended, normoactive bowel sounds  EXTREMITIES: No cyanosis, clubbing, or edema  SKIN:  No rash/erythema/ecchymoses  NEURO:  Alert and oriented x 3, no asterixis  RECTAL: Maroon colored stool  PSYCH: Normal affect    Vital Signs:  Vital Signs Last 24 Hrs  T(C): 36.7 (29 Oct 2020 05:57), Max: 37.1 (29 Oct 2020 01:43)  T(F): 98 (29 Oct 2020 05:57), Max: 98.8 (29 Oct 2020 01:43)  HR: 84 (29 Oct 2020 05:57) (79 - 104)  BP: 144/89 (29 Oct 2020 05:57) (98/62 - 147/76)  BP(mean): --  RR: 18 (29 Oct 2020 05:57) (16 - 18)  SpO2: 100% (29 Oct 2020 05:57) (99% - 100%)  Daily Height in cm: 160.02 (29 Oct 2020 05:57)    Daily     LABS:                        8.3    9.80  )-----------( 260      ( 29 Oct 2020 01:00 )             27.4     Mean Cell Volume: 79.4 fL (10-29-20 @ 01:00)    10-29    141  |  104  |  16  ----------------------------<  184<H>  3.7   |  24  |  0.57    Ca    8.8      29 Oct 2020 01:09  Phos  3.7     10-29  Mg     2.2     10-29    TPro  7.7  /  Alb  4.4  /  TBili  0.3  /  DBili  x   /  AST  17  /  ALT  7   /  AlkPhos  77  10-28    LIVER FUNCTIONS - ( 28 Oct 2020 17:28 )  Alb: 4.4 g/dL / Pro: 7.7 g/dL / ALK PHOS: 77 u/L / ALT: 7 u/L / AST: 17 u/L / GGT: x           PT/INR - ( 28 Oct 2020 17:28 )   PT: 12.0 SEC;   INR: 1.06          PTT - ( 28 Oct 2020 17:28 )  PTT:33.2 SEC                            8.3    9.80  )-----------( 260      ( 29 Oct 2020 01:00 )             27.4                         9.3    6.60  )-----------( 262      ( 28 Oct 2020 21:08 )             29.8                         9.6    6.90  )-----------( 290      ( 28 Oct 2020 17:28 )             31.4       Imaging:             Chief Complaint:  Patient is a 88y old  Female who presents with a chief complaint of Bloody stools x 2 days (28 Oct 2020 20:31)      HPI:  Ms. Vega is an 87yo F with PMH of RA who presents with maroon colored stools. Pt states that for the past 3 days she has been having 3-4 episodes of maroon colored stools. Pt denies use of ASA, NSAIDs, other blood thinners. Pt had a colonoscopy 'a long time ago', was found to have polyps. No prior EGD in the past. Pt otherwise denies nausea, vomiting, abd pain, change in bowel habits, dysphagia. In the ED, Hgb is 9.3 (9 range in 2013). Pt takes Lasix for LE edema but denies any heart problems.  Hb dropped to 8.3, but responded appropriately to 1 unit of PRBCs.    Allergies:  No Known Allergies      Home Medications:    Hospital Medications:  acetaminophen   Tablet .. 650 milliGRAM(s) Oral every 6 hours PRN  pantoprazole Infusion 8 mG/Hr IV Continuous <Continuous>  petrolatum white Ointment 1 Application(s) Topical daily  sodium chloride 0.9% lock flush 3 milliLiter(s) IV Push every 8 hours      PMHX/PSHX:    Rheumatoid arthritis  Arthritis  H/O Spinal surgery  H/O: hysterectomy      Family history:  No pertinent family history in first degree relatives  Denies family history of colon cancer/polyps, stomach cancer/polyps, pancreatic cancer/masses, liver cancer/disease, ovarian cancer and endometrial cancer.      Social History:   Tob: Denies  EtOH: Denies  Illicit Drugs: Denies      ROS:   General:  No wt loss, fevers, chills, night sweats, fatigue  Eyes:  Good vision, no reported pain  ENT:  No sore throat, pain, runny nose, dysphagia  CV:  No pain, palpitations, hypo/hypertension  Pulm:  No dyspnea, cough, tachypnea, wheezing  GI:  See HPI   :  No pain, bleeding, incontinence, nocturia  Muscle:  No pain, weakness  Neuro:  No weakness, tingling, memory problems  Psych:  No fatigue, insomnia, mood problems, depression  Endocrine:  No polyuria, polydipsia, cold/heat intolerance  Heme:  No petechiae, ecchymosis, easy bruisability  Skin:  No rash, tattoos, scars, edema      PHYSICAL EXAM:   GENERAL:  No acute distress  HEENT:  Normocephalic/atraumatic, no scleral icterus  NECK: supple  CHEST:  Clear to auscultation bilaterally  HEART:  Regular rate and rhythm, no murmurs/rubs/gallops  ABDOMEN:  Soft, non-tender, non-distended, normoactive bowel sounds  EXTREMITIES: No cyanosis, clubbing, or edema  SKIN:  No rash/erythema/ecchymoses  NEURO:  Alert and oriented x 3, no asterixis  RECTAL: Maroon colored stool  PSYCH: Normal affect    Vital Signs:  Vital Signs Last 24 Hrs  T(C): 36.7 (29 Oct 2020 05:57), Max: 37.1 (29 Oct 2020 01:43)  T(F): 98 (29 Oct 2020 05:57), Max: 98.8 (29 Oct 2020 01:43)  HR: 84 (29 Oct 2020 05:57) (79 - 104)  BP: 144/89 (29 Oct 2020 05:57) (98/62 - 147/76)  BP(mean): --  RR: 18 (29 Oct 2020 05:57) (16 - 18)  SpO2: 100% (29 Oct 2020 05:57) (99% - 100%)  Daily Height in cm: 160.02 (29 Oct 2020 05:57)    Daily     LABS:                        8.3    9.80  )-----------( 260      ( 29 Oct 2020 01:00 )             27.4     Mean Cell Volume: 79.4 fL (10-29-20 @ 01:00)    10-29    141  |  104  |  16  ----------------------------<  184<H>  3.7   |  24  |  0.57    Ca    8.8      29 Oct 2020 01:09  Phos  3.7     10-29  Mg     2.2     10-29    TPro  7.7  /  Alb  4.4  /  TBili  0.3  /  DBili  x   /  AST  17  /  ALT  7   /  AlkPhos  77  10-28    LIVER FUNCTIONS - ( 28 Oct 2020 17:28 )  Alb: 4.4 g/dL / Pro: 7.7 g/dL / ALK PHOS: 77 u/L / ALT: 7 u/L / AST: 17 u/L / GGT: x           PT/INR - ( 28 Oct 2020 17:28 )   PT: 12.0 SEC;   INR: 1.06          PTT - ( 28 Oct 2020 17:28 )  PTT:33.2 SEC                            8.3    9.80  )-----------( 260      ( 29 Oct 2020 01:00 )             27.4                         9.3    6.60  )-----------( 262      ( 28 Oct 2020 21:08 )             29.8                         9.6    6.90  )-----------( 290      ( 28 Oct 2020 17:28 )             31.4       Imaging:

## 2020-10-30 ENCOUNTER — RESULT REVIEW (OUTPATIENT)
Age: 85
End: 2020-10-30

## 2020-10-30 LAB
ANION GAP SERPL CALC-SCNC: 9 MMO/L — SIGNIFICANT CHANGE UP (ref 7–14)
BASE EXCESS BLDV CALC-SCNC: 2.9 MMOL/L — SIGNIFICANT CHANGE UP
BLOOD GAS VENOUS - CREATININE: 0.74 MG/DL — SIGNIFICANT CHANGE UP (ref 0.5–1.3)
BUN SERPL-MCNC: 16 MG/DL — SIGNIFICANT CHANGE UP (ref 7–23)
CALCIUM SERPL-MCNC: 8.6 MG/DL — SIGNIFICANT CHANGE UP (ref 8.4–10.5)
CHLORIDE BLDV-SCNC: 109 MMOL/L — HIGH (ref 96–108)
CHLORIDE SERPL-SCNC: 109 MMOL/L — HIGH (ref 98–107)
CO2 SERPL-SCNC: 26 MMOL/L — SIGNIFICANT CHANGE UP (ref 22–31)
CREAT SERPL-MCNC: 0.61 MG/DL — SIGNIFICANT CHANGE UP (ref 0.5–1.3)
GAS PNL BLDV: 144 MMOL/L — SIGNIFICANT CHANGE UP (ref 136–146)
GLUCOSE BLDV-MCNC: 100 MG/DL — HIGH (ref 70–99)
GLUCOSE SERPL-MCNC: 105 MG/DL — HIGH (ref 70–99)
HCO3 BLDV-SCNC: 26 MMOL/L — SIGNIFICANT CHANGE UP (ref 20–27)
HCT VFR BLD CALC: 27.6 % — LOW (ref 34.5–45)
HCT VFR BLDV CALC: 28.5 % — LOW (ref 34.5–45)
HGB BLD-MCNC: 8.6 G/DL — LOW (ref 11.5–15.5)
HGB BLDV-MCNC: 9.2 G/DL — LOW (ref 11.5–15.5)
LACTATE BLDV-MCNC: 1.3 MMOL/L — SIGNIFICANT CHANGE UP (ref 0.5–2)
MAGNESIUM SERPL-MCNC: 2.1 MG/DL — SIGNIFICANT CHANGE UP (ref 1.6–2.6)
MCHC RBC-ENTMCNC: 25.1 PG — LOW (ref 27–34)
MCHC RBC-ENTMCNC: 31.2 % — LOW (ref 32–36)
MCV RBC AUTO: 80.5 FL — SIGNIFICANT CHANGE UP (ref 80–100)
NRBC # FLD: 0 K/UL — SIGNIFICANT CHANGE UP (ref 0–0)
PCO2 BLDV: 44 MMHG — SIGNIFICANT CHANGE UP (ref 41–51)
PH BLDV: 7.41 PH — SIGNIFICANT CHANGE UP (ref 7.32–7.43)
PHOSPHATE SERPL-MCNC: 3.1 MG/DL — SIGNIFICANT CHANGE UP (ref 2.5–4.5)
PLATELET # BLD AUTO: 224 K/UL — SIGNIFICANT CHANGE UP (ref 150–400)
PMV BLD: 10.5 FL — SIGNIFICANT CHANGE UP (ref 7–13)
PO2 BLDV: 28 MMHG — LOW (ref 35–40)
POTASSIUM BLDV-SCNC: 3.8 MMOL/L — SIGNIFICANT CHANGE UP (ref 3.4–4.5)
POTASSIUM SERPL-MCNC: 3.8 MMOL/L — SIGNIFICANT CHANGE UP (ref 3.5–5.3)
POTASSIUM SERPL-SCNC: 3.8 MMOL/L — SIGNIFICANT CHANGE UP (ref 3.5–5.3)
RBC # BLD: 3.43 M/UL — LOW (ref 3.8–5.2)
RBC # FLD: 14.8 % — HIGH (ref 10.3–14.5)
SAO2 % BLDV: 48.3 % — LOW (ref 60–85)
SODIUM SERPL-SCNC: 144 MMOL/L — SIGNIFICANT CHANGE UP (ref 135–145)
WBC # BLD: 7.97 K/UL — SIGNIFICANT CHANGE UP (ref 3.8–10.5)
WBC # FLD AUTO: 7.97 K/UL — SIGNIFICANT CHANGE UP (ref 3.8–10.5)

## 2020-10-30 PROCEDURE — 88305 TISSUE EXAM BY PATHOLOGIST: CPT | Mod: 26

## 2020-10-30 PROCEDURE — 99233 SBSQ HOSP IP/OBS HIGH 50: CPT

## 2020-10-30 PROCEDURE — 45380 COLONOSCOPY AND BIOPSY: CPT | Mod: GC

## 2020-10-30 RX ADMIN — SODIUM CHLORIDE 3 MILLILITER(S): 9 INJECTION INTRAMUSCULAR; INTRAVENOUS; SUBCUTANEOUS at 18:29

## 2020-10-30 RX ADMIN — SODIUM CHLORIDE 3 MILLILITER(S): 9 INJECTION INTRAMUSCULAR; INTRAVENOUS; SUBCUTANEOUS at 05:32

## 2020-10-30 RX ADMIN — SODIUM CHLORIDE 3 MILLILITER(S): 9 INJECTION INTRAMUSCULAR; INTRAVENOUS; SUBCUTANEOUS at 21:41

## 2020-10-30 RX ADMIN — Medication 1 APPLICATION(S): at 18:28

## 2020-10-30 NOTE — PROGRESS NOTE ADULT - PROBLEM SELECTOR PLAN 1
No active bleeding since admission   -s/p 1 unit pRBC with appropriate response. H/H largely stable  -s/p EGD. no source of bleeding found. plan for colonoscopy today   -GI following, f/u post-colonoscopy recs No active bleeding since admission   -s/p 1 unit pRBC with appropriate response. H/H largely stable  -s/p EGD. no source of bleeding found  -s/p colonoscopy today - Diverticulosis in the entire examined colon, likely etiology of hematochezia. 30mm polyp, biopsied. No active bleeding   -resume regular diet   -GI following- will need outpt f/u for possible repeat colonoscopy

## 2020-10-30 NOTE — PROGRESS NOTE ADULT - SUBJECTIVE AND OBJECTIVE BOX
[facility] Division of Hospital Medicine  Carlos A Ferraro MD  Pager [phone]      Patient is a 88y old  Female who presents with a chief complaint of Bloody stools x 2 days (29 Oct 2020 13:58)      SUBJECTIVE / OVERNIGHT EVENTS:    No acute event. Started on bowel prep last night. Reports dark stool initially then clear liquid stool. Denies abd pain     ADDITIONAL REVIEW OF SYSTEMS:    RESPIRATORY: No cough, wheezing, chills or hemoptysis; No shortness of breath  CARDIOVASCULAR: No chest pain, palpitations, dizziness, trace leg swelling  GASTROINTESTINAL: No abdominal or epigastric pain. No nausea, vomiting, or hematemesis; No diarrhea or constipation.       MEDICATIONS  (STANDING):  petrolatum white Ointment 1 Application(s) Topical daily  sodium chloride 0.9% lock flush 3 milliLiter(s) IV Push every 8 hours    MEDICATIONS  (PRN):  acetaminophen   Tablet .. 650 milliGRAM(s) Oral every 6 hours PRN Temp greater or equal to 38C (100.4F), Mild Pain (1 - 3), Moderate Pain (4 - 6)      CAPILLARY BLOOD GLUCOSE        I&O's Summary      PHYSICAL EXAM:  Vital Signs Last 24 Hrs  T(C): 36.7 (30 Oct 2020 05:31), Max: 37.2 (29 Oct 2020 21:12)  T(F): 98 (30 Oct 2020 05:31), Max: 98.9 (29 Oct 2020 21:12)  HR: 79 (30 Oct 2020 05:31) (77 - 106)  BP: 136/71 (30 Oct 2020 05:31) (116/68 - 150/80)  BP(mean): --  RR: 18 (30 Oct 2020 05:31) (17 - 22)  SpO2: 100% (30 Oct 2020 05:31) (100% - 100%)    CONSTITUTIONAL: NAD, well-developed, well-groomed  EYES: PERRLA; conjunctiva and sclera clear  ENMT: Moist oral mucosa, no pharyngeal injection or exudates;   NECK: Supple, no palpable masses;   RESPIRATORY: Normal respiratory effort; lungs are clear to auscultation bilaterally  CARDIOVASCULAR: Regular rate and rhythm, normal S1 and S2, no murmur/rub/gallop; No lower extremity edema; Peripheral pulses are 2+ bilaterally  ABDOMEN: Nontender to palpation, normoactive bowel sounds, no rebound/guarding;   MUSCLOSKELETAL:  Normal gait; no clubbing or cyanosis of digits; no joint swelling or tenderness to palpation  PSYCH: A+O to person, place, and time; affect appropriate  NEUROLOGY: CN 2-12 are intact and symmetric; no gross sensory deficits;   SKIN: No rashes; no palpable lesions    LABS:                        8.6    7.97  )-----------( 224      ( 30 Oct 2020 06:55 )             27.6     10-30    144  |  109<H>  |  16  ----------------------------<  105<H>  3.8   |  26  |  0.61    Ca    8.6      30 Oct 2020 06:55  Phos  3.1     10-30  Mg     2.1     10-30    TPro  7.7  /  Alb  4.4  /  TBili  0.3  /  DBili  x   /  AST  17  /  ALT  7   /  AlkPhos  77  10-28    PT/INR - ( 28 Oct 2020 17:28 )   PT: 12.0 SEC;   INR: 1.06          PTT - ( 28 Oct 2020 17:28 )  PTT:33.2 SEC  CARDIAC MARKERS ( 29 Oct 2020 01:09 )  x     / x     / 53 u/L / 1.60 ng/mL / x                RADIOLOGY & ADDITIONAL TESTS:  Results Reviewed:   Imaging Personally Reviewed:  Electrocardiogram Personally Reviewed:    COORDINATION OF CARE:  Care Discussed with Consultants/Other Providers [Y/N]:  Prior or Outpatient Records Reviewed [Y/N]:

## 2020-10-31 LAB
BLD GP AB SCN SERPL QL: NEGATIVE — SIGNIFICANT CHANGE UP
HCT VFR BLD CALC: 29.5 % — LOW (ref 34.5–45)
HGB BLD-MCNC: 8.9 G/DL — LOW (ref 11.5–15.5)
MCHC RBC-ENTMCNC: 24.4 PG — LOW (ref 27–34)
MCHC RBC-ENTMCNC: 30.2 % — LOW (ref 32–36)
MCV RBC AUTO: 80.8 FL — SIGNIFICANT CHANGE UP (ref 80–100)
NRBC # FLD: 0.02 K/UL — SIGNIFICANT CHANGE UP (ref 0–0)
PLATELET # BLD AUTO: 271 K/UL — SIGNIFICANT CHANGE UP (ref 150–400)
PMV BLD: 10.8 FL — SIGNIFICANT CHANGE UP (ref 7–13)
RBC # BLD: 3.65 M/UL — LOW (ref 3.8–5.2)
RBC # FLD: 15 % — HIGH (ref 10.3–14.5)
RH IG SCN BLD-IMP: POSITIVE — SIGNIFICANT CHANGE UP
WBC # BLD: 7.67 K/UL — SIGNIFICANT CHANGE UP (ref 3.8–10.5)
WBC # FLD AUTO: 7.67 K/UL — SIGNIFICANT CHANGE UP (ref 3.8–10.5)

## 2020-10-31 PROCEDURE — 99232 SBSQ HOSP IP/OBS MODERATE 35: CPT

## 2020-10-31 RX ADMIN — SODIUM CHLORIDE 3 MILLILITER(S): 9 INJECTION INTRAMUSCULAR; INTRAVENOUS; SUBCUTANEOUS at 06:00

## 2020-10-31 RX ADMIN — SODIUM CHLORIDE 3 MILLILITER(S): 9 INJECTION INTRAMUSCULAR; INTRAVENOUS; SUBCUTANEOUS at 12:21

## 2020-10-31 RX ADMIN — Medication 1 APPLICATION(S): at 12:21

## 2020-10-31 RX ADMIN — SODIUM CHLORIDE 3 MILLILITER(S): 9 INJECTION INTRAMUSCULAR; INTRAVENOUS; SUBCUTANEOUS at 21:16

## 2020-10-31 NOTE — PROGRESS NOTE ADULT - PROBLEM SELECTOR PLAN 1
No active bleeding since admission   -s/p 1 unit pRBC with appropriate response. H/H largely stable  -s/p EGD. no source of bleeding found  -s/p colonoscopy today - Diverticulosis in the entire examined colon, likely etiology of hematochezia. 30mm polyp, biopsied. No active bleeding.  -resume regular diet   -Was advised by GI that pt will require polypectomy given their concerns for malignancy, tana given size. Colonoscopy will be done on TUESDAY.  Bowel prep as per GI.

## 2020-10-31 NOTE — PROGRESS NOTE ADULT - SUBJECTIVE AND OBJECTIVE BOX
[facility] Division of Hospital Medicine  Johnny GARCAI MerryJerrellDuane Mendez MD  Pager [phone]    SUBJECTIVE:  Follow up for blood stool.    The pt was seen and evaluated at bedside this AM. No overnight events. No recent BMs. No NV, SOB, CP, abd pain.       ROS: All systems negative except as noted.      Vital Signs Last 24 Hrs  T(C): 37 (31 Oct 2020 12:19), Max: 37 (31 Oct 2020 12:19)  T(F): 98.6 (31 Oct 2020 12:19), Max: 98.6 (31 Oct 2020 12:19)  HR: 80 (31 Oct 2020 12:19) (76 - 85)  BP: 122/80 (31 Oct 2020 12:19) (115/55 - 133/60)  BP(mean): --  RR: 18 (31 Oct 2020 12:19) (17 - 20)  SpO2: 100% (31 Oct 2020 12:19) (99% - 100%)      PHYSICAL EXAM:              MEDICATION:  MEDICATIONS  (STANDING):  petrolatum white Ointment 1 Application(s) Topical daily  sodium chloride 0.9% lock flush 3 milliLiter(s) IV Push every 8 hours    MEDICATIONS  (PRN):  acetaminophen   Tablet .. 650 milliGRAM(s) Oral every 6 hours PRN Temp greater or equal to 38C (100.4F), Mild Pain (1 - 3), Moderate Pain (4 - 6)            LABORATORY:                          8.9    7.67  )-----------( 271      ( 31 Oct 2020 12:38 )             29.5     10-30    144  |  109<H>  |  16  ----------------------------<  105<H>  3.8   |  26  |  0.61    Ca    8.6      30 Oct 2020 06:55  Phos  3.1     10-30  Mg     2.1     10-30                         [facility] Division of Hospital Medicine  Johnny GARCIA ([place]) MD Andrea  Pager [phone]    SUBJECTIVE:  Follow up for blood stool.    The pt was seen and evaluated at bedside this AM. No overnight events. No recent BMs. No NV, SOB, CP, abd pain.       ROS: All systems negative except as noted.      Vital Signs Last 24 Hrs  T(C): 37 (31 Oct 2020 12:19), Max: 37 (31 Oct 2020 12:19)  T(F): 98.6 (31 Oct 2020 12:19), Max: 98.6 (31 Oct 2020 12:19)  HR: 80 (31 Oct 2020 12:19) (76 - 85)  BP: 122/80 (31 Oct 2020 12:19) (115/55 - 133/60)  BP(mean): --  RR: 18 (31 Oct 2020 12:19) (17 - 20)  SpO2: 100% (31 Oct 2020 12:19) (99% - 100%)      PHYSICAL EXAM:  Gen- In bed, comfortable, NAD  Eyes- EOMI, PERRLA, nonicteric.  EMNT- Fair dentition. MMM. No tonsilar exudates. No posterior pharynx erythema.  Neck- Supple. No masses. No tracheal deviation.  Resp- CTAB, good effort. No r/r/w. No accessory muscle use.  CVS- RRR, S1S2, no g/r/m. No LE edema.  GI- Soft abd, NT, ND, +BSx4. No HSM.  MSK- No C/C. ROM intact. No crepitus.  Neuro- CN II-XII intact. Speech fluent/face symmetric. Sensation intact.  Skin- No rashes/ulcers. Warm/moist.  Psych- AAOx3. Appropriate mood/affect.      MEDICATION:  MEDICATIONS  (STANDING):  petrolatum white Ointment 1 Application(s) Topical daily  sodium chloride 0.9% lock flush 3 milliLiter(s) IV Push every 8 hours    MEDICATIONS  (PRN):  acetaminophen   Tablet .. 650 milliGRAM(s) Oral every 6 hours PRN Temp greater or equal to 38C (100.4F), Mild Pain (1 - 3), Moderate Pain (4 - 6)        LABORATORY:                          8.9    7.67  )-----------( 271      ( 31 Oct 2020 12:38 )             29.5     10-30    144  |  109<H>  |  16  ----------------------------<  105<H>  3.8   |  26  |  0.61    Ca    8.6      30 Oct 2020 06:55  Phos  3.1     10-30  Mg     2.1     10-30

## 2020-11-01 PROCEDURE — 99232 SBSQ HOSP IP/OBS MODERATE 35: CPT

## 2020-11-01 RX ADMIN — SODIUM CHLORIDE 3 MILLILITER(S): 9 INJECTION INTRAMUSCULAR; INTRAVENOUS; SUBCUTANEOUS at 21:01

## 2020-11-01 RX ADMIN — SODIUM CHLORIDE 3 MILLILITER(S): 9 INJECTION INTRAMUSCULAR; INTRAVENOUS; SUBCUTANEOUS at 05:34

## 2020-11-01 RX ADMIN — SODIUM CHLORIDE 3 MILLILITER(S): 9 INJECTION INTRAMUSCULAR; INTRAVENOUS; SUBCUTANEOUS at 12:40

## 2020-11-01 RX ADMIN — Medication 1 APPLICATION(S): at 12:40

## 2020-11-01 NOTE — PROGRESS NOTE ADULT - SUBJECTIVE AND OBJECTIVE BOX
[facility] Division of Hospital Medicine  Johnny Berger) MD Andrea  Pager [phone]    SUBJECTIVE:  Follow up for diverticulosis    The patient was seen and evaluated at bedside this AM. No o/n events. Denied any SOB/Cp/NV. Tolerating PO intake w/o issues. Had one BM ytd. States it was liquidy, but no blood was seen.       ROS: All systems negative except as noted.      Vital Signs Last 24 Hrs  T(C): 36.6 (01 Nov 2020 12:58), Max: 37 (31 Oct 2020 20:15)  T(F): 97.8 (01 Nov 2020 12:58), Max: 98.6 (31 Oct 2020 20:15)  HR: 80 (01 Nov 2020 12:58) (77 - 85)  BP: 110/65 (01 Nov 2020 12:58) (110/65 - 121/51)  BP(mean): --  RR: 17 (01 Nov 2020 12:58) (17 - 18)  SpO2: 100% (01 Nov 2020 12:58) (100% - 100%)      PHYSICAL EXAM:  Gen- In bed, comfortable, NAD  Eyes- EOMI, PERRLA, nonicteric.  EMNT- Fair dentition. MMM. No tonsilar exudates. No posterior pharynx erythema.  Neck- Supple. No masses. No tracheal deviation.  Resp- CTAB, good effort. No r/r/w. No accessory muscle use.  CVS- RRR, S1S2, no g/r/m. No LE edema.  GI- Soft abd, NT, ND, +BSx4. No HSM.  MSK- No C/C. ROM intact. No crepitus.  Neuro- CN II-XII intact. Speech fluent/face symmetric. Sensation intact.  Skin- No rashes/ulcers. Warm/moist.  Psych- AAOx3. Appropriate mood/affect.      MEDICATION:  MEDICATIONS  (STANDING):  petrolatum white Ointment 1 Application(s) Topical daily  sodium chloride 0.9% lock flush 3 milliLiter(s) IV Push every 8 hours    MEDICATIONS  (PRN):  acetaminophen   Tablet .. 650 milliGRAM(s) Oral every 6 hours PRN Temp greater or equal to 38C (100.4F), Mild Pain (1 - 3), Moderate Pain (4 - 6)        LABORATORY:                          8.9    7.67  )-----------( 271      ( 31 Oct 2020 12:38 )             29.5

## 2020-11-01 NOTE — PROGRESS NOTE ADULT - PROBLEM SELECTOR PLAN 1
No active bleeding since admission   -s/p 1 unit pRBC with appropriate response. H/H largely stable  -s/p EGD. no source of bleeding found  -s/p colonoscopy - Diverticulosis in the entire examined colon, likely etiology of hematochezia. 30mm polyp, biopsied. No active bleeding.  -resume regular diet   -Was advised by GI that pt will require polypectomy given their concerns for malignancy, tana given size. Colonoscopy will be done on TUESDAY.  Bowel prep as per GI. D/w GI fellow.

## 2020-11-02 ENCOUNTER — TRANSCRIPTION ENCOUNTER (OUTPATIENT)
Age: 85
End: 2020-11-02

## 2020-11-02 DIAGNOSIS — K63.5 POLYP OF COLON: ICD-10-CM

## 2020-11-02 LAB
HCT VFR BLD CALC: 26.4 % — LOW (ref 34.5–45)
HGB BLD-MCNC: 8.2 G/DL — LOW (ref 11.5–15.5)
MCHC RBC-ENTMCNC: 24.9 PG — LOW (ref 27–34)
MCHC RBC-ENTMCNC: 31.1 % — LOW (ref 32–36)
MCV RBC AUTO: 80.2 FL — SIGNIFICANT CHANGE UP (ref 80–100)
NRBC # FLD: 0 K/UL — SIGNIFICANT CHANGE UP (ref 0–0)
PLATELET # BLD AUTO: 281 K/UL — SIGNIFICANT CHANGE UP (ref 150–400)
PMV BLD: SIGNIFICANT CHANGE UP FL (ref 7–13)
RBC # BLD: 3.29 M/UL — LOW (ref 3.8–5.2)
RBC # FLD: 15.3 % — HIGH (ref 10.3–14.5)
WBC # BLD: 6.49 K/UL — SIGNIFICANT CHANGE UP (ref 3.8–10.5)
WBC # FLD AUTO: 6.49 K/UL — SIGNIFICANT CHANGE UP (ref 3.8–10.5)

## 2020-11-02 PROCEDURE — 99232 SBSQ HOSP IP/OBS MODERATE 35: CPT | Mod: GC

## 2020-11-02 PROCEDURE — 99232 SBSQ HOSP IP/OBS MODERATE 35: CPT

## 2020-11-02 RX ORDER — SOD SULF/SODIUM/NAHCO3/KCL/PEG
4000 SOLUTION, RECONSTITUTED, ORAL ORAL ONCE
Refills: 0 | Status: COMPLETED | OUTPATIENT
Start: 2020-11-02 | End: 2020-11-02

## 2020-11-02 RX ADMIN — Medication 10 MILLIGRAM(S): at 18:35

## 2020-11-02 RX ADMIN — Medication 1 APPLICATION(S): at 12:31

## 2020-11-02 RX ADMIN — Medication 4000 MILLILITER(S): at 18:35

## 2020-11-02 RX ADMIN — SODIUM CHLORIDE 3 MILLILITER(S): 9 INJECTION INTRAMUSCULAR; INTRAVENOUS; SUBCUTANEOUS at 12:31

## 2020-11-02 RX ADMIN — SODIUM CHLORIDE 3 MILLILITER(S): 9 INJECTION INTRAMUSCULAR; INTRAVENOUS; SUBCUTANEOUS at 21:19

## 2020-11-02 RX ADMIN — SODIUM CHLORIDE 3 MILLILITER(S): 9 INJECTION INTRAMUSCULAR; INTRAVENOUS; SUBCUTANEOUS at 05:41

## 2020-11-02 NOTE — PROGRESS NOTE ADULT - PROBLEM SELECTOR PLAN 1
Incidentally noted during colonoscopy. Given size and morphology, will need to r/o malignancy  - f/u 10/30 biopsy  - plan for polypectomy tomorrow  - clear liquid diet for now. NPO after midnight  - bowel prep this evening  - f/u further GI recs

## 2020-11-02 NOTE — PHYSICAL THERAPY INITIAL EVALUATION ADULT - GENERAL OBSERVATIONS, REHAB EVAL
Pt received semi-de oliveira in bed, NAD. Pt agreeable to PT consultation. Cleared for PT as per DAQUAN Wesley

## 2020-11-02 NOTE — PHYSICAL THERAPY INITIAL EVALUATION ADULT - PERTINENT HX OF CURRENT PROBLEM, REHAB EVAL
88 y.o. female history rheumatoid arthritis (not on steroids or DMARD) presenting with GIB/acute blood loss anemia

## 2020-11-02 NOTE — DISCHARGE NOTE PROVIDER - CARE PROVIDERS DIRECT ADDRESSES
,DirectAddress_Unknown ,DirectAddress_Unknown,[email or web address]

## 2020-11-02 NOTE — DISCHARGE NOTE PROVIDER - NSDCMRMEDTOKEN_GEN_ALL_CORE_FT
furosemide 40 mg oral tablet: 1 tab(s) orally once a day  Tylenol 325 mg oral tablet: 2 tab(s) orally every 6 hours, As Needed  Vaseline topical ointment: Apply topically to affected area once a day  Vitamin D3 1000 intl units (25 mcg) oral tablet: 1 tab(s) orally once a day

## 2020-11-02 NOTE — PHYSICAL THERAPY INITIAL EVALUATION ADULT - DISCHARGE DISPOSITION, PT EVAL
Home with home PT in order to address independence during ADLs and ambulation and decrease fall risk during all functional activities/home w/ home PT

## 2020-11-02 NOTE — PHYSICAL THERAPY INITIAL EVALUATION ADULT - HEALTH SCREEN CRITERIA
Unable to reach patient by phone, multiple attempts made, no mailbox for home number, mobile number is incorrect. Will mail letter out requesting follow up appointment. yes

## 2020-11-02 NOTE — DISCHARGE NOTE PROVIDER - CARE PROVIDER_API CALL
Thomas,   Please follow up with Dr. Guzman [phone] in 1-2 weeks  Phone: (   )    -  Fax: (   )    -  Follow Up Time:    Thomas,   Please follow up with Dr. Guzman [phone] in 1-2 weeks  Phone: (   )    -  Fax: (   )    -  Follow Up Time:     Jono Roberto)  Gastroenterology; Internal Medicine  [address] [place] [address]  Phone: [phone]  Fax: [phone]  Established Patient  Follow Up Time:

## 2020-11-02 NOTE — DISCHARGE NOTE PROVIDER - PROVIDER TOKENS
FREE:[LAST:[Thomas],PHONE:[(   )    -],FAX:[(   )    -],ADDRESS:[Please follow up with Dr. Guzman [phone] in 1-2 weeks]] FREE:[LAST:[Thomas],PHONE:[(   )    -],FAX:[(   )    -],ADDRESS:[Please follow up with Dr. Guzman [phone] in 1-2 weeks]],PROVIDER:[TOKEN:[[id number]],ESTABLISHEDPATIENT:[T]]

## 2020-11-02 NOTE — PROGRESS NOTE ADULT - SUBJECTIVE AND OBJECTIVE BOX
Patient is a 88y old  Female who presents with a chief complaint of Bloody stools x 2 days (02 Nov 2020 08:12)      SUBJECTIVE / OVERNIGHT EVENTS:  Patient feels well and has no complaints. Pt denied blood per rectum or bloody stools. No fever, chest pain, SOB, n/v or abdominal pain. Pt awaiting repeat colonoscopy tomorrow. Tolerating clear liquid diet well.     MEDICATIONS  (STANDING):  petrolatum white Ointment 1 Application(s) Topical daily  sodium chloride 0.9% lock flush 3 milliLiter(s) IV Push every 8 hours    MEDICATIONS  (PRN):  acetaminophen   Tablet .. 650 milliGRAM(s) Oral every 6 hours PRN Temp greater or equal to 38C (100.4F), Mild Pain (1 - 3), Moderate Pain (4 - 6)      Vital Signs Last 24 Hrs  T(C): 36.5 (02 Nov 2020 06:03), Max: 36.9 (01 Nov 2020 21:32)  T(F): 97.7 (02 Nov 2020 06:03), Max: 98.4 (01 Nov 2020 21:32)  HR: 77 (02 Nov 2020 06:03) (77 - 82)  BP: 113/80 (02 Nov 2020 06:03) (103/51 - 113/80)  BP(mean): --  RR: 17 (02 Nov 2020 06:03) (17 - 17)  SpO2: 100% (02 Nov 2020 06:03) (99% - 100%)          PHYSICAL EXAM  GENERAL: NAD, well-developed  HEAD:  Atraumatic, Normocephalic  EYES: EOMI, PERRLA, conjunctiva and sclera clear  NECK: Supple, No JVD  CHEST/LUNG: Clear to auscultation bilaterally; No wheeze  HEART: Regular rate and rhythm; No murmurs, rubs, or gallops  ABDOMEN: Soft, Nontender, Nondistended; Bowel sounds present  EXTREMITIES:  2+ Peripheral Pulses, No clubbing, cyanosis, or edema  PSYCH: AAOx3  SKIN: No rashes or lesions        LABS:                        8.2    6.49  )-----------( 281      ( 02 Nov 2020 06:37 )             26.4                     RADIOLOGY & ADDITIONAL TESTS:    Imaging Personally Reviewed:  Consultant(s) Notes Reviewed:    Care Discussed with Consultants/Other Providers:   Patient is a 88y old  Female who presents with a chief complaint of Bloody stools x 2 days (02 Nov 2020 08:12)      SUBJECTIVE / OVERNIGHT EVENTS:  Patient feels well and has no complaints. Pt denied blood per rectum or bloody stools. No fever, chest pain, SOB, n/v or abdominal pain. Pt awaiting repeat colonoscopy tomorrow. Tolerating clear liquid diet well.     MEDICATIONS  (STANDING):  petrolatum white Ointment 1 Application(s) Topical daily  sodium chloride 0.9% lock flush 3 milliLiter(s) IV Push every 8 hours    MEDICATIONS  (PRN):  acetaminophen   Tablet .. 650 milliGRAM(s) Oral every 6 hours PRN Temp greater or equal to 38C (100.4F), Mild Pain (1 - 3), Moderate Pain (4 - 6)      Vital Signs Last 24 Hrs  T(C): 36.5 (02 Nov 2020 06:03), Max: 36.9 (01 Nov 2020 21:32)  T(F): 97.7 (02 Nov 2020 06:03), Max: 98.4 (01 Nov 2020 21:32)  HR: 77 (02 Nov 2020 06:03) (77 - 82)  BP: 113/80 (02 Nov 2020 06:03) (103/51 - 113/80)  BP(mean): --  RR: 17 (02 Nov 2020 06:03) (17 - 17)  SpO2: 100% (02 Nov 2020 06:03) (99% - 100%)          PHYSICAL EXAM  GENERAL: NAD, obese  CHEST/LUNG: Clear to auscultation bilaterally; No wheeze  HEART: Regular rate and rhythm; +systolic murmur  ABDOMEN: Soft, Nontender, Nondistended  EXTREMITIES:  2+ Peripheral Pulses, No clubbing, cyanosis. B/l 1+ pitting edema of LE. Nontender swelling of right wrist and thenar region of right hand  PSYCH: AAOx3        LABS:                        8.2    6.49  )-----------( 281      ( 02 Nov 2020 06:37 )             26.4               Consultant(s) Notes Reviewed:  yes  Care Discussed with Consultants/Other Providers:

## 2020-11-02 NOTE — PROGRESS NOTE ADULT - SUBJECTIVE AND OBJECTIVE BOX
Chief Complaint:  Patient is a 88y old  Female who presents with a chief complaint of Bloody stools x 2 days (02 Nov 2020 07:15)      Interval Events: No acute overnight events. Pt had one small BM- no blood, yellow in color. Pt denies any complaints.   ROS: All 12 point system except listed above were otherwise negative.    Allergies:  No Known Allergies        Hospital Medications:  acetaminophen   Tablet .. 650 milliGRAM(s) Oral every 6 hours PRN  petrolatum white Ointment 1 Application(s) Topical daily  sodium chloride 0.9% lock flush 3 milliLiter(s) IV Push every 8 hours      PMHX/PSHX:  Rheumatoid arthritis    Arthritis    H/O Spinal surgery    H/O: hysterectomy        Family history:  No pertinent family history in first degree relatives      There is no family history of peptic ulcer disease, gastric cancer, colon polyps, colon cancer, celiac disease, biliary, hepatic, or pancreatic disease.  None of the female relatives have breast, uterine, or ovarian cancer.     PHYSICAL EXAM:   Vital Signs:  Vital Signs Last 24 Hrs  T(C): 36.5 (02 Nov 2020 06:03), Max: 36.9 (01 Nov 2020 21:32)  T(F): 97.7 (02 Nov 2020 06:03), Max: 98.4 (01 Nov 2020 21:32)  HR: 77 (02 Nov 2020 06:03) (77 - 82)  BP: 113/80 (02 Nov 2020 06:03) (103/51 - 113/80)  BP(mean): --  RR: 17 (02 Nov 2020 06:03) (17 - 17)  SpO2: 100% (02 Nov 2020 06:03) (99% - 100%)  Daily     Daily     PHYSICAL EXAM:   GENERAL:  No acute distress  HEENT:  Normocephalic/atraumatic, no scleral icterus  NECK: supple  CHEST:  Clear to auscultation bilaterally  HEART:  Regular rate and rhythm, no murmurs/rubs/gallops  ABDOMEN:  Soft, non-tender, non-distended, normoactive bowel sounds  EXTREMITIES: No cyanosis, clubbing, or edema  SKIN:  No rash/erythema/ecchymoses  NEURO:  Alert and oriented x 3, no asterixis      LABS:                        8.2    6.49  )-----------( 281      ( 02 Nov 2020 06:37 )             26.4     Mean Cell Volume: 80.2 fL (11-02-20 @ 06:37)                                        8.2    6.49  )-----------( 281      ( 02 Nov 2020 06:37 )             26.4                         8.9    7.67  )-----------( 271      ( 31 Oct 2020 12:38 )             29.5     Imaging:    < from: Colonoscopy (10.30.20 @ 13:45) >                                                                                   Findings:       Many small and large-mouthed diverticula were found in the entire colon.       A 30 mm sessile, flat polyp was found in the ascending colon near the IC        valve. Biopsies were taken with a cold forceps for histology.       Non-bleeding internal hemorrhoids were found during retroflexion. The        hemorrhoids were small.       The exam was otherwise normal throughout the examined colon. Noactive        bleeding was seen.                                                                                   Impression:          - No active bleeding seen in the colon.                       - Diverticulosis in the entire examined colon, likely                        etiology of hematochezia.                       - 30 mm flat polyp in the ascending colon near the IC                        valve. Biopsied.                       - Non-bleeding internal hemorrhoids.  Recommendation:      - [facility].                       - Resume previous diet.                       - Monitor CBC daily.                       - Follow up pathology results.                       - Will need repeat colonoscopy for polypectomy pending                        pathology results. Will discuss timing with patient.    < end of copied text >

## 2020-11-02 NOTE — DISCHARGE NOTE PROVIDER - HOSPITAL COURSE
89yo female hx rheumatoid arthritis (not on steroids or DMARD) p/w GIB/acute blood loss anemia.   -s/p 1 unit pRBC with appropriate response. H/H largely stable. s/p EGD. no source of bleeding found  -s/p colonoscopy - Diverticulosis in the entire examined colon, likely etiology of hematochezia. 30mm polyp, biopsied. No active bleeding.   Rheumatoid arthritis-not on any disease suppression medications, outpt follow up      87yo female hx rheumatoid arthritis (not on steroids or DMARD) p/w anemia 2/2 acute blood loss. Pt received 1 unit pRBC with appropriate response. H/H largely stable. s/p EGD. no source of bleeding found.  Pt underwent Colonoscopy (10/30) showing Diverticulosis in the entire examined colon, likely etiology of hematochezia. In addition, 30mm polyp found and biopsied. No active bleeding s/p   colonoscopy. Pt NPO p MN 11/2 for polypectomy of sessile colonic polyp with GI 11/3. Pt also with hx of Rheumatoid arthritis-not on any disease suppression medications, outpt follow up. c/w Tylenol for pain control.      **************Pt stable s/p polypectomy. VSS. HD stable. H/H WNL. Pt to follow up with GI outpatient________??????    DISPO: Home with home PT         87yo female hx rheumatoid arthritis (not on steroids or DMARD) p/w anemia 2/2 acute blood loss. Pt received 1 unit pRBC with appropriate response. H/H largely stable. s/p EGD. no source of bleeding found.  Pt underwent Colonoscopy (10/30) showing Diverticulosis in the entire examined colon, likely etiology of hematochezia. In addition, 30mm polyp found and biopsied. No active bleeding s/p   colonoscopy. Pt NPO p MN 11/2 for polypectomy of sessile colonic polyp with GI 11/3. Pt also with hx of Rheumatoid arthritis-not on any disease suppression medications, outpt follow up. c/w Tylenol for pain control.      Pt stable s/p polypectomy. VSS. HD stable. H/H WNL. Pt to follow up with GI outpatient.    DISPO: Home with home PT

## 2020-11-02 NOTE — DISCHARGE NOTE PROVIDER - NSDCCPCAREPLAN_GEN_ALL_CORE_FT
PRINCIPAL DISCHARGE DIAGNOSIS  Diagnosis: Lower GI bleed  Assessment and Plan of Treatment: You were anemic due to an acute blood loss and received a blood transfusion which stabilized you.   You had an upper endoscopy and colonoscopy on 10/30, showing Diverticulosis throughout with a large 30mm polyp which was biopsied.   You had a polypectomy with GI on 11/3.  Follow up with your PCP.   Follow up with GI_____________________________      SECONDARY DISCHARGE DIAGNOSES  Diagnosis: Rheumatoid arthritis  Assessment and Plan of Treatment: Rheumatoid arthritis  Currently not on any suppression medications.   continue to control your pain with Tylenol.       PRINCIPAL DISCHARGE DIAGNOSIS  Diagnosis: Lower GI bleed  Assessment and Plan of Treatment: You were anemic due to an acute blood loss and received a blood transfusion which stabilized you.   You had an upper endoscopy and colonoscopy on 10/30, showing Diverticulosis throughout with a large 30mm polyp which was biopsied.   You had a polypectomy with GI on 11/3.  Follow up with your PCP.   Follow up with GI as directed      SECONDARY DISCHARGE DIAGNOSES  Diagnosis: Rheumatoid arthritis  Assessment and Plan of Treatment: Rheumatoid arthritis  Currently not on any suppression medications.   continue to control your pain with Tylenol.

## 2020-11-03 ENCOUNTER — TRANSCRIPTION ENCOUNTER (OUTPATIENT)
Age: 85
End: 2020-11-03

## 2020-11-03 ENCOUNTER — RESULT REVIEW (OUTPATIENT)
Age: 85
End: 2020-11-03

## 2020-11-03 VITALS
OXYGEN SATURATION: 99 % | RESPIRATION RATE: 20 BRPM | TEMPERATURE: 97 F | DIASTOLIC BLOOD PRESSURE: 88 MMHG | SYSTOLIC BLOOD PRESSURE: 147 MMHG | HEART RATE: 75 BPM

## 2020-11-03 DIAGNOSIS — E87.8 OTHER DISORDERS OF ELECTROLYTE AND FLUID BALANCE, NOT ELSEWHERE CLASSIFIED: ICD-10-CM

## 2020-11-03 LAB
ANION GAP SERPL CALC-SCNC: 9 MMO/L — SIGNIFICANT CHANGE UP (ref 7–14)
BUN SERPL-MCNC: 6 MG/DL — LOW (ref 7–23)
CALCIUM SERPL-MCNC: 8.6 MG/DL — SIGNIFICANT CHANGE UP (ref 8.4–10.5)
CHLORIDE SERPL-SCNC: 111 MMOL/L — HIGH (ref 98–107)
CO2 SERPL-SCNC: 27 MMOL/L — SIGNIFICANT CHANGE UP (ref 22–31)
CREAT SERPL-MCNC: 0.47 MG/DL — LOW (ref 0.5–1.3)
GLUCOSE SERPL-MCNC: 104 MG/DL — HIGH (ref 70–99)
HCT VFR BLD CALC: 25.6 % — LOW (ref 34.5–45)
HGB BLD-MCNC: 7.9 G/DL — LOW (ref 11.5–15.5)
INR BLD: 1.13 — SIGNIFICANT CHANGE UP (ref 0.88–1.16)
MAGNESIUM SERPL-MCNC: 2.2 MG/DL — SIGNIFICANT CHANGE UP (ref 1.6–2.6)
MCHC RBC-ENTMCNC: 24.8 PG — LOW (ref 27–34)
MCHC RBC-ENTMCNC: 30.9 % — LOW (ref 32–36)
MCV RBC AUTO: 80.5 FL — SIGNIFICANT CHANGE UP (ref 80–100)
NRBC # FLD: 0 K/UL — SIGNIFICANT CHANGE UP (ref 0–0)
PHOSPHATE SERPL-MCNC: 3 MG/DL — SIGNIFICANT CHANGE UP (ref 2.5–4.5)
PLATELET # BLD AUTO: 303 K/UL — SIGNIFICANT CHANGE UP (ref 150–400)
PMV BLD: 10.6 FL — SIGNIFICANT CHANGE UP (ref 7–13)
POTASSIUM SERPL-MCNC: 3.3 MMOL/L — LOW (ref 3.5–5.3)
POTASSIUM SERPL-SCNC: 3.3 MMOL/L — LOW (ref 3.5–5.3)
PROTHROM AB SERPL-ACNC: 12.9 SEC — SIGNIFICANT CHANGE UP (ref 10.6–13.6)
RBC # BLD: 3.18 M/UL — LOW (ref 3.8–5.2)
RBC # FLD: 15.7 % — HIGH (ref 10.3–14.5)
SODIUM SERPL-SCNC: 147 MMOL/L — HIGH (ref 135–145)
WBC # BLD: 6.69 K/UL — SIGNIFICANT CHANGE UP (ref 3.8–10.5)
WBC # FLD AUTO: 6.69 K/UL — SIGNIFICANT CHANGE UP (ref 3.8–10.5)

## 2020-11-03 PROCEDURE — 45390 COLONOSCOPY W/RESECTION: CPT | Mod: GC

## 2020-11-03 PROCEDURE — 88305 TISSUE EXAM BY PATHOLOGIST: CPT | Mod: 26

## 2020-11-03 PROCEDURE — 99239 HOSP IP/OBS DSCHRG MGMT >30: CPT

## 2020-11-03 PROCEDURE — 45388 COLONOSCOPY W/ABLATION: CPT | Mod: GC,59

## 2020-11-03 RX ORDER — POTASSIUM CHLORIDE 20 MEQ
40 PACKET (EA) ORAL EVERY 6 HOURS
Refills: 0 | Status: DISCONTINUED | OUTPATIENT
Start: 2020-11-03 | End: 2020-11-03

## 2020-11-03 RX ADMIN — SODIUM CHLORIDE 3 MILLILITER(S): 9 INJECTION INTRAMUSCULAR; INTRAVENOUS; SUBCUTANEOUS at 05:20

## 2020-11-03 NOTE — PROGRESS NOTE ADULT - REASON FOR ADMISSION
Acute blood loss anemia 2/2 hematochezia
Bloody stools x 2 days

## 2020-11-03 NOTE — PROGRESS NOTE ADULT - PROBLEM SELECTOR PROBLEM 2
Anemia due to acute blood loss
Electrolyte disturbance
Anemia due to acute blood loss
Lower GI bleed

## 2020-11-03 NOTE — PROGRESS NOTE ADULT - PROBLEM SELECTOR PROBLEM 1
Gastrointestinal hemorrhage with melena
Sessile colonic polyp
Gastrointestinal hemorrhage with melena
Sessile colonic polyp

## 2020-11-03 NOTE — PROGRESS NOTE ADULT - ASSESSMENT
87yo female hx rheumatoid arthritis (not on steroids or DMARD) p/w GIB/acute blood loss anemia
87yo female hx rheumatoid arthritis (not on steroids or DMARD) p/w GIB/acute blood loss anemia.
89yo female hx rheumatoid arthritis (not on steroids or DMARD) p/w GIB/acute blood loss anemia
Impression:  1) Maroon colored stools with acute blood loss anemia- S/P colonoscopy revealing 30mm flat polyp in ascending colon near IC valve, biopsied, also diverticulosis in colon likely source of hematochezia.  2) RA    Recommendations:  -Plan for repeat colonoscopy for polypectomy/EMR tomorrow  -Clear liquid diet today  -NPO past midnight  -Prep to be ordered by GI fellow  -Case discussed with advanced team    Nicholas Mederos, PGY6  Gastroenterology Fellow  Pager # [phone] / [phone]  Can be contacted via Microsoft Teams  
Ms. Vega is an 89 yo woman with hx of rheumatoid arthritis (not on steroids or DMARD) admitted for acute blood loss anemia 2/2 GI bleeding. Pt s/p colonoscopy on 10/30 revealing diverticulosis as likely etiology for hematochezia as well as 30mm sessile polyp near ileocecal valve. Pt now asymptomatic with relatively stable Hb/Hct
87yo female hx rheumatoid arthritis (not on steroids or DMARD) p/w GIB/acute blood loss anemia.
Ms. Vega is an 89 yo woman with hx of rheumatoid arthritis (not on steroids or DMARD) admitted for acute blood loss anemia 2/2 GI bleeding. Pt s/p colonoscopy on 10/30 revealing diverticulosis as likely etiology for hematochezia as well as 30mm sessile polyp near ileocecal valve. Pt now asymptomatic with stable Hb/Hct

## 2020-11-03 NOTE — PROGRESS NOTE ADULT - PROBLEM SELECTOR PLAN 4
VTE ppx with B/L Venodyne; No Heparin sq DVT due to recent GI bleed
VTE with B/L Venodyne; No Heparin sq DVT due to active GI bleed
VTE with B/L Venodyne; No Heparin sq DVT due to active GI bleed
d/t GIB - H/H currently stable. s/p 1 unit pRBC  -f/u CBC daily, transfuse if Hgb<7
Currently not on any disease suppression medications.   Tylenol PRN pain.
VTE ppx with B/L Venodyne; No Heparin sq DVT due to recent GI bleed

## 2020-11-03 NOTE — PROGRESS NOTE ADULT - SUBJECTIVE AND OBJECTIVE BOX
Patient is a 88y old  Female who presents with a chief complaint of Acute blood loss anemia 2/2 hematochezia (02 Nov 2020 11:32)    SUBJECTIVE / OVERNIGHT EVENTS:  No events overnight. Patient feels well. No BRBPR or bloody stools overnight. No fever, chills, chest pain, SOB, n/v or abdominal pain. Pt awaiting polypectomy by GI today.    MEDICATIONS  (STANDING):  petrolatum white Ointment 1 Application(s) Topical daily  potassium chloride   Powder 40 milliEquivalent(s) Oral every 6 hours  sodium chloride 0.9% lock flush 3 milliLiter(s) IV Push every 8 hours    MEDICATIONS  (PRN):  acetaminophen   Tablet .. 650 milliGRAM(s) Oral every 6 hours PRN Temp greater or equal to 38C (100.4F), Mild Pain (1 - 3), Moderate Pain (4 - 6)      Vital Signs Last 24 Hrs  T(C): 36.5 (03 Nov 2020 05:28), Max: 36.9 (02 Nov 2020 15:21)  T(F): 97.7 (03 Nov 2020 05:28), Max: 98.4 (02 Nov 2020 15:21)  HR: 81 (03 Nov 2020 05:28) (74 - 81)  BP: 116/55 (03 Nov 2020 05:28) (116/55 - 158/65)  RR: 17 (03 Nov 2020 05:28) (17 - 17)  SpO2: 98% (03 Nov 2020 05:28) (98% - 100%)          PHYSICAL EXAM  GENERAL: NAD, obese, appears younger than stated age  CHEST/LUNG: Clear to auscultation bilaterally; No wheeze  HEART: Regular rate and rhythm; +systolic murmur  ABDOMEN: Soft, Nontender, Nondistended  EXTREMITIES:  2+ Peripheral Pulses, No clubbing, cyanosis. B/l 1+ pitting edema of LE. Nontender swelling of right wrist and thenar region of right hand  PSYCH: AAOx3        LABS:                        7.9    6.69  )-----------( 303      ( 03 Nov 2020 06:15 )             25.6     11-03    147<H>  |  111<H>  |  6<L>  ----------------------------<  104<H>  3.3<L>   |  27  |  0.47<L>    Ca    8.6      03 Nov 2020 06:15  Phos  3.0     11-03  Mg     2.2     11-03      PT/INR - ( 03 Nov 2020 06:15 )   PT: 12.9 SEC;   INR: 1.13                Consultant(s) Notes Reviewed:  yes  Care Discussed with Consultants/Other Providers:

## 2020-11-03 NOTE — DISCHARGE NOTE NURSING/CASE MANAGEMENT/SOCIAL WORK - PATIENT PORTAL LINK FT
You can access the FollowMyHealth Patient Portal offered by Health system by registering at the following website: [email or web address]. By joining [facility]’s FollowMyHealth portal, you will also be able to view your health information using other applications (apps) compatible with our system.

## 2020-11-03 NOTE — PROGRESS NOTE ADULT - PROBLEM SELECTOR PLAN 5
Currently not on any disease suppression medications.   Tylenol PRN pain.
VTE ppx with B/L Venodyne; No Heparin sq DVT due to recent GI bleed

## 2020-11-03 NOTE — PROGRESS NOTE ADULT - PROBLEM SELECTOR PLAN 2
Mild hypernatremia and hypokalemia - likely due to overnight bowel prep leading to fluid and electrolyte loss through GI tract  - replete potassium   - will encourage adequate hydration s/p GI procedure today.
d/t GIB   -s/p 1 unit pRBC as above  -f/u CBC daily, transfuse if Hgb<7
d/t GIB   -s/p 1 unit pRBC as above  -f/u CBC daily, transfuse if Hgb<7
d/t GIB - H/H currently stable.   -s/p 1 unit pRBC as above  -f/u CBC daily, transfuse if Hgb<7
No active bleeding since admission. Likely due to diverticulosis seen on colonoscopy. S/p EGD with no source of bleeding found
d/t GIB - H/H currently stable.   -s/p 1 unit pRBC as above  -f/u CBC daily, transfuse if Hgb<7

## 2020-11-03 NOTE — PROGRESS NOTE ADULT - PROBLEM SELECTOR PROBLEM 3
Lower GI bleed
Rheumatoid arthritis
Anemia due to acute blood loss
Rheumatoid arthritis

## 2020-11-03 NOTE — PROGRESS NOTE ADULT - PROBLEM SELECTOR PROBLEM 4
Anemia due to acute blood loss
Need for prophylactic measure
Rheumatoid arthritis

## 2020-11-03 NOTE — PROGRESS NOTE ADULT - PROBLEM SELECTOR PLAN 3
Currently not on any disease suppression medications.   Tylenol PRN pain.
No active bleeding since admission. Likely due to diverticulosis seen on colonoscopy. S/p EGD with no source of bleeding found
Currently not on any disease suppression medications.   Tylenol PRN pain.
d/t GIB - H/H currently stable. s/p 1 unit pRBC  -f/u CBC daily, transfuse if Hgb<7

## 2020-11-03 NOTE — PROGRESS NOTE ADULT - PROBLEM SELECTOR PLAN 1
Incidentally noted during colonoscopy. Given size and morphology, will need to r/o malignancy and get it excised. 10/30 colonic polyp biopsy revealed tubular adenoma.  - plan for polypectomy today  - NPO for now. Will resume diet as per GI after procedure  - f/u further GI recs

## 2020-11-10 ENCOUNTER — NON-APPOINTMENT (OUTPATIENT)
Age: 85
End: 2020-11-10

## 2021-03-08 DIAGNOSIS — K63.5 POLYP OF COLON: ICD-10-CM

## 2021-03-08 RX ORDER — SODIUM SULFATE, POTASSIUM SULFATE, MAGNESIUM SULFATE 17.5; 3.13; 1.6 G/ML; G/ML; G/ML
17.5-3.13-1.6 SOLUTION, CONCENTRATE ORAL
Qty: 1 | Refills: 0 | Status: ACTIVE | COMMUNITY
Start: 2021-03-08 | End: 1900-01-01

## 2021-03-12 PROBLEM — M06.9 RHEUMATOID ARTHRITIS, UNSPECIFIED: Chronic | Status: ACTIVE | Noted: 2020-10-28

## 2021-05-24 DIAGNOSIS — Z01.818 ENCOUNTER FOR OTHER PREPROCEDURAL EXAMINATION: ICD-10-CM

## 2021-05-25 ENCOUNTER — APPOINTMENT (OUTPATIENT)
Dept: DISASTER EMERGENCY | Facility: CLINIC | Age: 86
End: 2021-05-25

## 2021-05-25 LAB — SARS-COV-2 N GENE NPH QL NAA+PROBE: NOT DETECTED

## 2021-05-28 ENCOUNTER — APPOINTMENT (OUTPATIENT)
Dept: GASTROENTEROLOGY | Facility: HOSPITAL | Age: 86
End: 2021-05-28

## 2021-05-28 ENCOUNTER — RESULT REVIEW (OUTPATIENT)
Age: 86
End: 2021-05-28

## 2021-05-28 ENCOUNTER — OUTPATIENT (OUTPATIENT)
Dept: OUTPATIENT SERVICES | Facility: HOSPITAL | Age: 86
LOS: 1 days | Discharge: HOME | End: 2021-05-28
Payer: MEDICARE

## 2021-05-28 VITALS
HEIGHT: 63 IN | DIASTOLIC BLOOD PRESSURE: 69 MMHG | RESPIRATION RATE: 20 BRPM | SYSTOLIC BLOOD PRESSURE: 141 MMHG | HEART RATE: 81 BPM | WEIGHT: 184.97 LBS | TEMPERATURE: 98 F

## 2021-05-28 VITALS
DIASTOLIC BLOOD PRESSURE: 65 MMHG | OXYGEN SATURATION: 97 % | SYSTOLIC BLOOD PRESSURE: 141 MMHG | HEART RATE: 71 BPM | RESPIRATION RATE: 20 BRPM

## 2021-05-28 DIAGNOSIS — Z98.890 OTHER SPECIFIED POSTPROCEDURAL STATES: Chronic | ICD-10-CM

## 2021-05-28 DIAGNOSIS — K63.5 POLYP OF COLON: ICD-10-CM

## 2021-05-28 PROCEDURE — 88305 TISSUE EXAM BY PATHOLOGIST: CPT | Mod: 26

## 2021-05-28 PROCEDURE — 45380 COLONOSCOPY AND BIOPSY: CPT | Mod: GC

## 2021-05-28 RX ORDER — SODIUM CHLORIDE 9 MG/ML
500 INJECTION, SOLUTION INTRAVENOUS
Refills: 0 | Status: DISCONTINUED | OUTPATIENT
Start: 2021-05-28 | End: 2021-06-11

## 2021-05-28 RX ORDER — SODIUM CHLORIDE 9 MG/ML
500 INJECTION INTRAMUSCULAR; INTRAVENOUS; SUBCUTANEOUS
Refills: 0 | Status: DISCONTINUED | OUTPATIENT
Start: 2021-05-28 | End: 2021-06-11

## 2021-05-28 NOTE — ASU PATIENT PROFILE, ADULT - VISION (WITH CORRECTIVE LENSES IF THE PATIENT USUALLY WEARS THEM):
uses eye eye glass/Normal vision: sees adequately in most situations; can see medication labels, newsprint

## 2022-03-17 NOTE — ASU PATIENT PROFILE, ADULT - DOMESTIC TRAVEL HIGH RISK QUESTION
Sarecycline Counseling: Patient advised regarding possible photosensitivity and discoloration of the teeth, skin, lips, tongue and gums.  Patient instructed to avoid sunlight, if possible.  When exposed to sunlight, patients should wear protective clothing, sunglasses, and sunscreen.  The patient was instructed to call the office immediately if the following severe adverse effects occur:  hearing changes, easy bruising/bleeding, severe headache, or vision changes.  The patient verbalized understanding of the proper use and possible adverse effects of sarecycline.  All of the patient's questions and concerns were addressed. No Tazorac Pregnancy And Lactation Text: This medication is not safe during pregnancy. It is unknown if this medication is excreted in breast milk. Azelaic Acid Pregnancy And Lactation Text: This medication is considered safe during pregnancy and breast feeding. Doxycycline Pregnancy And Lactation Text: This medication is Pregnancy Category D and not consider safe during pregnancy. It is also excreted in breast milk but is considered safe for shorter treatment courses. Topical Clindamycin Counseling: Patient counseled that this medication may cause skin irritation or allergic reactions.  In the event of skin irritation, the patient was advised to reduce the amount of the drug applied or use it less frequently.   The patient verbalized understanding of the proper use and possible adverse effects of clindamycin.  All of the patient's questions and concerns were addressed. Spironolactone Counseling: Patient advised regarding risks of diarrhea, abdominal pain, hyperkalemia, birth defects (for female patients), liver toxicity and renal toxicity. The patient may need blood work to monitor liver and kidney function and potassium levels while on therapy. The patient verbalized understanding of the proper use and possible adverse effects of spironolactone.  All of the patient's questions and concerns were addressed. Azelaic Acid Counseling: Patient counseled that medicine may cause skin irritation and to avoid applying near the eyes.  In the event of skin irritation, the patient was advised to reduce the amount of the drug applied or use it less frequently.   The patient verbalized understanding of the proper use and possible adverse effects of azelaic acid.  All of the patient's questions and concerns were addressed. Topical Retinoid counseling:  Patient advised to apply a pea-sized amount only at bedtime and wait 30 minutes after washing their face before applying.  If too drying, patient may add a non-comedogenic moisturizer. The patient verbalized understanding of the proper use and possible adverse effects of retinoids.  All of the patient's questions and concerns were addressed. Erythromycin Counseling:  I discussed with the patient the risks of erythromycin including but not limited to GI upset, allergic reaction, drug rash, diarrhea, increase in liver enzymes, and yeast infections. Tazorac Counseling:  Patient advised that medication is irritating and drying.  Patient may need to apply sparingly and wash off after an hour before eventually leaving it on overnight.  The patient verbalized understanding of the proper use and possible adverse effects of tazorac.  All of the patient's questions and concerns were addressed. Isotretinoin Pregnancy And Lactation Text: This medication is Pregnancy Category X and is considered extremely dangerous during pregnancy. It is unknown if it is excreted in breast milk. Bactrim Counseling:  I discussed with the patient the risks of sulfa antibiotics including but not limited to GI upset, allergic reaction, drug rash, diarrhea, dizziness, photosensitivity, and yeast infections.  Rarely, more serious reactions can occur including but not limited to aplastic anemia, agranulocytosis, methemoglobinemia, blood dyscrasias, liver or kidney failure, lung infiltrates or desquamative/blistering drug rashes. Birth Control Pills Pregnancy And Lactation Text: This medication should be avoided if pregnant and for the first 30 days post-partum. Dapsone Counseling: I discussed with the patient the risks of dapsone including but not limited to hemolytic anemia, agranulocytosis, rashes, methemoglobinemia, kidney failure, peripheral neuropathy, headaches, GI upset, and liver toxicity.  Patients who start dapsone require monitoring including baseline LFTs and weekly CBCs for the first month, then every month thereafter.  The patient verbalized understanding of the proper use and possible adverse effects of dapsone.  All of the patient's questions and concerns were addressed. High Dose Vitamin A Counseling: Side effects reviewed, pt to contact office should one occur. Minocycline Counseling: Patient advised regarding possible photosensitivity and discoloration of the teeth, skin, lips, tongue and gums.  Patient instructed to avoid sunlight, if possible.  When exposed to sunlight, patients should wear protective clothing, sunglasses, and sunscreen.  The patient was instructed to call the office immediately if the following severe adverse effects occur:  hearing changes, easy bruising/bleeding, severe headache, or vision changes.  The patient verbalized understanding of the proper use and possible adverse effects of minocycline.  All of the patient's questions and concerns were addressed. Benzoyl Peroxide Pregnancy And Lactation Text: This medication is Pregnancy Category C. It is unknown if benzoyl peroxide is excreted in breast milk. Topical Clindamycin Pregnancy And Lactation Text: This medication is Pregnancy Category B and is considered safe during pregnancy. It is unknown if it is excreted in breast milk. Birth Control Pills Counseling: Birth Control Pill Counseling: I discussed with the patient the potential side effects of OCPs including but not limited to increased risk of stroke, heart attack, thrombophlebitis, deep venous thrombosis, hepatic adenomas, breast changes, GI upset, headaches, and depression.  The patient verbalized understanding of the proper use and possible adverse effects of OCPs. All of the patient's questions and concerns were addressed. Bactrim Pregnancy And Lactation Text: This medication is Pregnancy Category D and is known to cause fetal risk.  It is also excreted in breast milk. Azithromycin Pregnancy And Lactation Text: This medication is considered safe during pregnancy and is also secreted in breast milk. Winlevi Counseling:  I discussed with the patient the risks of topical clascoterone including but not limited to erythema, scaling, itching, and stinging. Patient voiced their understanding. High Dose Vitamin A Pregnancy And Lactation Text: High dose vitamin A therapy is contraindicated during pregnancy and breast feeding. Detail Level: Detailed Isotretinoin Counseling: Patient should get monthly blood tests, not donate blood, not drive at night if vision affected, not share medication, and not undergo elective surgery for 6 months after tx completed. Side effects reviewed, pt to contact office should one occur. Minocycline Pregnancy And Lactation Text: This medication is Pregnancy Category D and not consider safe during pregnancy. It is also excreted in breast milk. Azithromycin Counseling:  I discussed with the patient the risks of azithromycin including but not limited to GI upset, allergic reaction, drug rash, diarrhea, and yeast infections. Winlevi Pregnancy And Lactation Text: This medication is considered safe during pregnancy and breastfeeding. Erythromycin Pregnancy And Lactation Text: This medication is Pregnancy Category B and is considered safe during pregnancy. It is also excreted in breast milk. Tetracycline Counseling: Patient counseled regarding possible photosensitivity and increased risk for sunburn.  Patient instructed to avoid sunlight, if possible.  When exposed to sunlight, patients should wear protective clothing, sunglasses, and sunscreen.  The patient was instructed to call the office immediately if the following severe adverse effects occur:  hearing changes, easy bruising/bleeding, severe headache, or vision changes.  The patient verbalized understanding of the proper use and possible adverse effects of tetracycline.  All of the patient's questions and concerns were addressed. Patient understands to avoid pregnancy while on therapy due to potential birth defects. Use Enhanced Medication Counseling?: No Topical Sulfur Applications Pregnancy And Lactation Text: This medication is Pregnancy Category C and has an unknown safety profile during pregnancy. It is unknown if this topical medication is excreted in breast milk. Dapsone Pregnancy And Lactation Text: This medication is Pregnancy Category C and is not considered safe during pregnancy or breast feeding. Spironolactone Pregnancy And Lactation Text: This medication can cause feminization of the male fetus and should be avoided during pregnancy. The active metabolite is also found in breast milk. Topical Retinoid Pregnancy And Lactation Text: This medication is Pregnancy Category C. It is unknown if this medication is excreted in breast milk. Doxycycline Counseling:  Patient counseled regarding possible photosensitivity and increased risk for sunburn.  Patient instructed to avoid sunlight, if possible.  When exposed to sunlight, patients should wear protective clothing, sunglasses, and sunscreen.  The patient was instructed to call the office immediately if the following severe adverse effects occur:  hearing changes, easy bruising/bleeding, severe headache, or vision changes.  The patient verbalized understanding of the proper use and possible adverse effects of doxycycline.  All of the patient's questions and concerns were addressed. Benzoyl Peroxide Counseling: Patient counseled that medicine may cause skin irritation and bleach clothing.  In the event of skin irritation, the patient was advised to reduce the amount of the drug applied or use it less frequently.   The patient verbalized understanding of the proper use and possible adverse effects of benzoyl peroxide.  All of the patient's questions and concerns were addressed. Topical Sulfur Applications Counseling: Topical Sulfur Counseling: Patient counseled that this medication may cause skin irritation or allergic reactions.  In the event of skin irritation, the patient was advised to reduce the amount of the drug applied or use it less frequently.   The patient verbalized understanding of the proper use and possible adverse effects of topical sulfur application.  All of the patient's questions and concerns were addressed.

## 2023-01-26 NOTE — H&P ADULT - NEGATIVE GENERAL GENITOURINARY SYMPTOMS
You were seen in the Electrophysiology Clinic today by: Dr Beatriz Wing    Plan:   Medication Changes:     Labs/Tests Needed: none    Follow up Visit:   Dr Wing in 1 year with device check prior  Pacemaker remote checks every 3 months      If you have further questions, please utilize [employer] to contact us.     Your Care Team:    [facility]   Telephone Number     Nurse Line  Polina Hall, RN   Lisette Diamond, [place]   [phone]     For scheduling appointments:   [facility]   [phone]   For procedure scheduling:    Gricelda Gil     [phone]   For the Device Clinic (Pacemakers, ICDs, Loop Recorders)    During business hours: [phone]  After business hours:   [phone]- select option 4 and ask for job code [id number].       On-call cardiologist for after hours or on weekends:   [phone], option #4, and ask to speak to the on-call cardiologist.     Cardiovascular Clinic:   [address] [place] [address]. [place] [address]      As always, Thank you for trusting us with your health care needs!      no dysuria no flank pain R/no hematuria/no flank pain L/no dysuria

## 2023-09-15 ENCOUNTER — OUTPATIENT (OUTPATIENT)
Dept: OUTPATIENT SERVICES | Facility: HOSPITAL | Age: 88
LOS: 1 days | Discharge: HOME | End: 2023-09-15

## 2023-09-15 DIAGNOSIS — L89.90 PRESSURE ULCER OF UNSPECIFIED SITE, UNSPECIFIED STAGE: ICD-10-CM

## 2023-09-15 DIAGNOSIS — Z98.890 OTHER SPECIFIED POSTPROCEDURAL STATES: Chronic | ICD-10-CM

## 2023-09-18 DIAGNOSIS — M79.604 PAIN IN RIGHT LEG: ICD-10-CM

## 2023-09-18 DIAGNOSIS — I87.313 CHRONIC VENOUS HYPERTENSION (IDIOPATHIC) WITH ULCER OF BILATERAL LOWER EXTREMITY: ICD-10-CM

## 2023-09-18 DIAGNOSIS — L97.322 NON-PRESSURE CHRONIC ULCER OF LEFT ANKLE WITH FAT LAYER EXPOSED: ICD-10-CM

## 2023-09-18 DIAGNOSIS — M06.9 RHEUMATOID ARTHRITIS, UNSPECIFIED: ICD-10-CM

## 2023-09-18 DIAGNOSIS — Z98.42 CATARACT EXTRACTION STATUS, LEFT EYE: ICD-10-CM

## 2023-09-18 DIAGNOSIS — L97.812 NON-PRESSURE CHRONIC ULCER OF OTHER PART OF RIGHT LOWER LEG WITH FAT LAYER EXPOSED: ICD-10-CM

## 2023-09-18 DIAGNOSIS — Z98.41 CATARACT EXTRACTION STATUS, RIGHT EYE: ICD-10-CM

## 2023-09-18 DIAGNOSIS — Z87.891 PERSONAL HISTORY OF NICOTINE DEPENDENCE: ICD-10-CM

## 2023-09-18 DIAGNOSIS — M79.605 PAIN IN LEFT LEG: ICD-10-CM

## 2023-09-21 ENCOUNTER — OUTPATIENT (OUTPATIENT)
Dept: OUTPATIENT SERVICES | Facility: HOSPITAL | Age: 88
LOS: 1 days | Discharge: HOME | End: 2023-09-21

## 2023-09-21 DIAGNOSIS — Z98.890 OTHER SPECIFIED POSTPROCEDURAL STATES: Chronic | ICD-10-CM

## 2023-09-21 DIAGNOSIS — L89.90 PRESSURE ULCER OF UNSPECIFIED SITE, UNSPECIFIED STAGE: ICD-10-CM

## 2023-09-25 DIAGNOSIS — Z98.42 CATARACT EXTRACTION STATUS, LEFT EYE: ICD-10-CM

## 2023-09-25 DIAGNOSIS — M79.604 PAIN IN RIGHT LEG: ICD-10-CM

## 2023-09-25 DIAGNOSIS — I87.313 CHRONIC VENOUS HYPERTENSION (IDIOPATHIC) WITH ULCER OF BILATERAL LOWER EXTREMITY: ICD-10-CM

## 2023-09-25 DIAGNOSIS — Z87.891 PERSONAL HISTORY OF NICOTINE DEPENDENCE: ICD-10-CM

## 2023-09-25 DIAGNOSIS — M79.605 PAIN IN LEFT LEG: ICD-10-CM

## 2023-09-25 DIAGNOSIS — Z98.41 CATARACT EXTRACTION STATUS, RIGHT EYE: ICD-10-CM

## 2023-09-25 DIAGNOSIS — L97.812 NON-PRESSURE CHRONIC ULCER OF OTHER PART OF RIGHT LOWER LEG WITH FAT LAYER EXPOSED: ICD-10-CM

## 2023-09-25 DIAGNOSIS — M06.9 RHEUMATOID ARTHRITIS, UNSPECIFIED: ICD-10-CM

## 2023-09-25 DIAGNOSIS — L97.322 NON-PRESSURE CHRONIC ULCER OF LEFT ANKLE WITH FAT LAYER EXPOSED: ICD-10-CM

## 2023-09-28 ENCOUNTER — OUTPATIENT (OUTPATIENT)
Dept: OUTPATIENT SERVICES | Facility: HOSPITAL | Age: 88
LOS: 1 days | Discharge: HOME | End: 2023-09-28

## 2023-09-28 DIAGNOSIS — L89.90 PRESSURE ULCER OF UNSPECIFIED SITE, UNSPECIFIED STAGE: ICD-10-CM

## 2023-09-28 DIAGNOSIS — Z98.890 OTHER SPECIFIED POSTPROCEDURAL STATES: Chronic | ICD-10-CM

## 2023-10-05 ENCOUNTER — OUTPATIENT (OUTPATIENT)
Dept: OUTPATIENT SERVICES | Facility: HOSPITAL | Age: 88
LOS: 1 days | Discharge: HOME | End: 2023-10-05

## 2023-10-05 DIAGNOSIS — Z98.890 OTHER SPECIFIED POSTPROCEDURAL STATES: Chronic | ICD-10-CM

## 2023-10-05 DIAGNOSIS — L89.90 PRESSURE ULCER OF UNSPECIFIED SITE, UNSPECIFIED STAGE: ICD-10-CM

## 2023-10-10 DIAGNOSIS — L97.812 NON-PRESSURE CHRONIC ULCER OF OTHER PART OF RIGHT LOWER LEG WITH FAT LAYER EXPOSED: ICD-10-CM

## 2023-10-10 DIAGNOSIS — Z98.41 CATARACT EXTRACTION STATUS, RIGHT EYE: ICD-10-CM

## 2023-10-10 DIAGNOSIS — M79.605 PAIN IN LEFT LEG: ICD-10-CM

## 2023-10-10 DIAGNOSIS — I87.313 CHRONIC VENOUS HYPERTENSION (IDIOPATHIC) WITH ULCER OF BILATERAL LOWER EXTREMITY: ICD-10-CM

## 2023-10-10 DIAGNOSIS — Z98.42 CATARACT EXTRACTION STATUS, LEFT EYE: ICD-10-CM

## 2023-10-10 DIAGNOSIS — L97.322 NON-PRESSURE CHRONIC ULCER OF LEFT ANKLE WITH FAT LAYER EXPOSED: ICD-10-CM

## 2023-10-10 DIAGNOSIS — M79.604 PAIN IN RIGHT LEG: ICD-10-CM

## 2023-10-10 DIAGNOSIS — Z87.891 PERSONAL HISTORY OF NICOTINE DEPENDENCE: ICD-10-CM

## 2023-10-10 DIAGNOSIS — L97.822 NON-PRESSURE CHRONIC ULCER OF OTHER PART OF LEFT LOWER LEG WITH FAT LAYER EXPOSED: ICD-10-CM

## 2023-10-10 DIAGNOSIS — M06.9 RHEUMATOID ARTHRITIS, UNSPECIFIED: ICD-10-CM

## 2023-10-12 ENCOUNTER — OUTPATIENT (OUTPATIENT)
Dept: OUTPATIENT SERVICES | Facility: HOSPITAL | Age: 88
LOS: 1 days | Discharge: HOME | End: 2023-10-12

## 2023-10-12 DIAGNOSIS — Z98.890 OTHER SPECIFIED POSTPROCEDURAL STATES: Chronic | ICD-10-CM

## 2023-10-12 DIAGNOSIS — L89.90 PRESSURE ULCER OF UNSPECIFIED SITE, UNSPECIFIED STAGE: ICD-10-CM

## 2023-10-13 DIAGNOSIS — L97.812 NON-PRESSURE CHRONIC ULCER OF OTHER PART OF RIGHT LOWER LEG WITH FAT LAYER EXPOSED: ICD-10-CM

## 2023-10-13 DIAGNOSIS — Z98.41 CATARACT EXTRACTION STATUS, RIGHT EYE: ICD-10-CM

## 2023-10-13 DIAGNOSIS — M79.605 PAIN IN LEFT LEG: ICD-10-CM

## 2023-10-13 DIAGNOSIS — Z98.42 CATARACT EXTRACTION STATUS, LEFT EYE: ICD-10-CM

## 2023-10-13 DIAGNOSIS — I87.313 CHRONIC VENOUS HYPERTENSION (IDIOPATHIC) WITH ULCER OF BILATERAL LOWER EXTREMITY: ICD-10-CM

## 2023-10-13 DIAGNOSIS — L97.822 NON-PRESSURE CHRONIC ULCER OF OTHER PART OF LEFT LOWER LEG WITH FAT LAYER EXPOSED: ICD-10-CM

## 2023-10-13 DIAGNOSIS — M06.9 RHEUMATOID ARTHRITIS, UNSPECIFIED: ICD-10-CM

## 2023-10-13 DIAGNOSIS — Z87.891 PERSONAL HISTORY OF NICOTINE DEPENDENCE: ICD-10-CM

## 2023-10-13 DIAGNOSIS — M79.604 PAIN IN RIGHT LEG: ICD-10-CM

## 2023-10-19 ENCOUNTER — OUTPATIENT (OUTPATIENT)
Dept: OUTPATIENT SERVICES | Facility: HOSPITAL | Age: 88
LOS: 1 days | Discharge: HOME | End: 2023-10-19

## 2023-10-19 DIAGNOSIS — Z98.890 OTHER SPECIFIED POSTPROCEDURAL STATES: Chronic | ICD-10-CM

## 2023-10-19 DIAGNOSIS — L89.90 PRESSURE ULCER OF UNSPECIFIED SITE, UNSPECIFIED STAGE: ICD-10-CM

## 2023-10-23 DIAGNOSIS — Z98.42 CATARACT EXTRACTION STATUS, LEFT EYE: ICD-10-CM

## 2023-10-23 DIAGNOSIS — M06.9 RHEUMATOID ARTHRITIS, UNSPECIFIED: ICD-10-CM

## 2023-10-23 DIAGNOSIS — I87.2 VENOUS INSUFFICIENCY (CHRONIC) (PERIPHERAL): ICD-10-CM

## 2023-10-23 DIAGNOSIS — Z98.41 CATARACT EXTRACTION STATUS, RIGHT EYE: ICD-10-CM

## 2023-10-23 DIAGNOSIS — Z87.891 PERSONAL HISTORY OF NICOTINE DEPENDENCE: ICD-10-CM

## 2024-04-29 ENCOUNTER — EMERGENCY (EMERGENCY)
Facility: HOSPITAL | Age: 89
LOS: 1 days | Discharge: HOME | End: 2024-04-29
Attending: STUDENT IN AN ORGANIZED HEALTH CARE EDUCATION/TRAINING PROGRAM | Admitting: STUDENT IN AN ORGANIZED HEALTH CARE EDUCATION/TRAINING PROGRAM
Payer: MEDICARE

## 2024-04-29 VITALS
TEMPERATURE: 100 F | HEART RATE: 95 BPM | DIASTOLIC BLOOD PRESSURE: 77 MMHG | OXYGEN SATURATION: 99 % | SYSTOLIC BLOOD PRESSURE: 156 MMHG | RESPIRATION RATE: 18 BRPM

## 2024-04-29 VITALS
RESPIRATION RATE: 16 BRPM | TEMPERATURE: 99 F | DIASTOLIC BLOOD PRESSURE: 75 MMHG | OXYGEN SATURATION: 100 % | SYSTOLIC BLOOD PRESSURE: 123 MMHG | HEART RATE: 90 BPM

## 2024-04-29 DIAGNOSIS — Z98.890 OTHER SPECIFIED POSTPROCEDURAL STATES: Chronic | ICD-10-CM

## 2024-04-29 LAB
ALBUMIN SERPL ELPH-MCNC: 4.3 G/DL — SIGNIFICANT CHANGE UP (ref 3.3–5)
ALP SERPL-CCNC: 90 U/L — SIGNIFICANT CHANGE UP (ref 40–120)
ALT FLD-CCNC: 9 U/L — SIGNIFICANT CHANGE UP (ref 4–33)
ANION GAP SERPL CALC-SCNC: 15 MMOL/L — HIGH (ref 7–14)
ANISOCYTOSIS BLD QL: SLIGHT — SIGNIFICANT CHANGE UP
APPEARANCE UR: CLEAR — SIGNIFICANT CHANGE UP
AST SERPL-CCNC: 16 U/L — SIGNIFICANT CHANGE UP (ref 4–32)
BACTERIA # UR AUTO: NEGATIVE /HPF — SIGNIFICANT CHANGE UP
BASE EXCESS BLDV CALC-SCNC: 4.1 MMOL/L — HIGH (ref -2–3)
BASOPHILS # BLD AUTO: 0 K/UL — SIGNIFICANT CHANGE UP (ref 0–0.2)
BASOPHILS NFR BLD AUTO: 0 % — SIGNIFICANT CHANGE UP (ref 0–2)
BILIRUB SERPL-MCNC: 0.4 MG/DL — SIGNIFICANT CHANGE UP (ref 0.2–1.2)
BILIRUB UR-MCNC: NEGATIVE — SIGNIFICANT CHANGE UP
BLOOD GAS VENOUS COMPREHENSIVE RESULT: SIGNIFICANT CHANGE UP
BUN SERPL-MCNC: 17 MG/DL — SIGNIFICANT CHANGE UP (ref 7–23)
CALCIUM SERPL-MCNC: 8.9 MG/DL — SIGNIFICANT CHANGE UP (ref 8.4–10.5)
CAST: 1 /LPF — SIGNIFICANT CHANGE UP (ref 0–4)
CHLORIDE BLDV-SCNC: 100 MMOL/L — SIGNIFICANT CHANGE UP (ref 96–108)
CHLORIDE SERPL-SCNC: 99 MMOL/L — SIGNIFICANT CHANGE UP (ref 98–107)
CO2 BLDV-SCNC: 30.6 MMOL/L — HIGH (ref 22–26)
CO2 SERPL-SCNC: 24 MMOL/L — SIGNIFICANT CHANGE UP (ref 22–31)
COLOR SPEC: YELLOW — SIGNIFICANT CHANGE UP
CREAT SERPL-MCNC: 0.62 MG/DL — SIGNIFICANT CHANGE UP (ref 0.5–1.3)
DIFF PNL FLD: NEGATIVE — SIGNIFICANT CHANGE UP
EGFR: 84 ML/MIN/1.73M2 — SIGNIFICANT CHANGE UP
EOSINOPHIL # BLD AUTO: 0.07 K/UL — SIGNIFICANT CHANGE UP (ref 0–0.5)
EOSINOPHIL NFR BLD AUTO: 0.9 % — SIGNIFICANT CHANGE UP (ref 0–6)
FLUAV AG NPH QL: SIGNIFICANT CHANGE UP
FLUBV AG NPH QL: SIGNIFICANT CHANGE UP
GAS PNL BLDV: 136 MMOL/L — SIGNIFICANT CHANGE UP (ref 136–145)
GAS PNL BLDV: SIGNIFICANT CHANGE UP
GIANT PLATELETS BLD QL SMEAR: PRESENT — SIGNIFICANT CHANGE UP
GLUCOSE BLDV-MCNC: 112 MG/DL — HIGH (ref 70–99)
GLUCOSE SERPL-MCNC: 110 MG/DL — HIGH (ref 70–99)
GLUCOSE UR QL: NEGATIVE MG/DL — SIGNIFICANT CHANGE UP
HCO3 BLDV-SCNC: 29 MMOL/L — SIGNIFICANT CHANGE UP (ref 22–29)
HCT VFR BLD CALC: 34.8 % — SIGNIFICANT CHANGE UP (ref 34.5–45)
HCT VFR BLDA CALC: 34 % — LOW (ref 34.5–46.5)
HGB BLD CALC-MCNC: 11.3 G/DL — LOW (ref 11.7–16.1)
HGB BLD-MCNC: 11.3 G/DL — LOW (ref 11.5–15.5)
HYPOCHROMIA BLD QL: SLIGHT — SIGNIFICANT CHANGE UP
IANC: 5.25 K/UL — SIGNIFICANT CHANGE UP (ref 1.8–7.4)
KETONES UR-MCNC: ABNORMAL MG/DL
LACTATE BLDV-MCNC: 1.2 MMOL/L — SIGNIFICANT CHANGE UP (ref 0.5–2)
LEUKOCYTE ESTERASE UR-ACNC: ABNORMAL
LYMPHOCYTES # BLD AUTO: 0.45 K/UL — LOW (ref 1–3.3)
LYMPHOCYTES # BLD AUTO: 6.1 % — LOW (ref 13–44)
MCHC RBC-ENTMCNC: 24.6 PG — LOW (ref 27–34)
MCHC RBC-ENTMCNC: 32.5 GM/DL — SIGNIFICANT CHANGE UP (ref 32–36)
MCV RBC AUTO: 75.8 FL — LOW (ref 80–100)
MICROCYTES BLD QL: SLIGHT — SIGNIFICANT CHANGE UP
MONOCYTES # BLD AUTO: 1.16 K/UL — HIGH (ref 0–0.9)
MONOCYTES NFR BLD AUTO: 15.6 % — HIGH (ref 2–14)
NEUTROPHILS # BLD AUTO: 5.7 K/UL — SIGNIFICANT CHANGE UP (ref 1.8–7.4)
NEUTROPHILS NFR BLD AUTO: 74.8 % — SIGNIFICANT CHANGE UP (ref 43–77)
NEUTS BAND # BLD: 1.7 % — SIGNIFICANT CHANGE UP (ref 0–6)
NITRITE UR-MCNC: NEGATIVE — SIGNIFICANT CHANGE UP
NT-PROBNP SERPL-SCNC: 193 PG/ML — SIGNIFICANT CHANGE UP
OVALOCYTES BLD QL SMEAR: SLIGHT — SIGNIFICANT CHANGE UP
PCO2 BLDV: 45 MMHG — SIGNIFICANT CHANGE UP (ref 39–52)
PH BLDV: 7.42 — SIGNIFICANT CHANGE UP (ref 7.32–7.43)
PH UR: 7 — SIGNIFICANT CHANGE UP (ref 5–8)
PLAT MORPH BLD: NORMAL — SIGNIFICANT CHANGE UP
PLATELET # BLD AUTO: 307 K/UL — SIGNIFICANT CHANGE UP (ref 150–400)
PLATELET COUNT - ESTIMATE: NORMAL — SIGNIFICANT CHANGE UP
PO2 BLDV: 41 MMHG — SIGNIFICANT CHANGE UP (ref 25–45)
POIKILOCYTOSIS BLD QL AUTO: SLIGHT — SIGNIFICANT CHANGE UP
POTASSIUM BLDV-SCNC: 3.9 MMOL/L — SIGNIFICANT CHANGE UP (ref 3.5–5.1)
POTASSIUM SERPL-MCNC: 4 MMOL/L — SIGNIFICANT CHANGE UP (ref 3.5–5.3)
POTASSIUM SERPL-SCNC: 4 MMOL/L — SIGNIFICANT CHANGE UP (ref 3.5–5.3)
PROT SERPL-MCNC: 7.9 G/DL — SIGNIFICANT CHANGE UP (ref 6–8.3)
PROT UR-MCNC: SIGNIFICANT CHANGE UP MG/DL
RBC # BLD: 4.59 M/UL — SIGNIFICANT CHANGE UP (ref 3.8–5.2)
RBC # FLD: 14.9 % — HIGH (ref 10.3–14.5)
RBC BLD AUTO: ABNORMAL
RBC CASTS # UR COMP ASSIST: 3 /HPF — SIGNIFICANT CHANGE UP (ref 0–4)
REVIEW: SIGNIFICANT CHANGE UP
RSV RNA NPH QL NAA+NON-PROBE: SIGNIFICANT CHANGE UP
SAO2 % BLDV: 69 % — SIGNIFICANT CHANGE UP (ref 67–88)
SARS-COV-2 RNA SPEC QL NAA+PROBE: DETECTED
SODIUM SERPL-SCNC: 138 MMOL/L — SIGNIFICANT CHANGE UP (ref 135–145)
SP GR SPEC: 1.02 — SIGNIFICANT CHANGE UP (ref 1–1.03)
SQUAMOUS # UR AUTO: 2 /HPF — SIGNIFICANT CHANGE UP (ref 0–5)
TROPONIN T, HIGH SENSITIVITY RESULT: 12 NG/L — SIGNIFICANT CHANGE UP
TROPONIN T, HIGH SENSITIVITY RESULT: 15 NG/L — SIGNIFICANT CHANGE UP
TSH SERPL-MCNC: 0.74 UIU/ML — SIGNIFICANT CHANGE UP (ref 0.27–4.2)
UROBILINOGEN FLD QL: 1 MG/DL — SIGNIFICANT CHANGE UP (ref 0.2–1)
VARIANT LYMPHS # BLD: 0.9 % — SIGNIFICANT CHANGE UP (ref 0–6)
WBC # BLD: 7.45 K/UL — SIGNIFICANT CHANGE UP (ref 3.8–10.5)
WBC # FLD AUTO: 7.45 K/UL — SIGNIFICANT CHANGE UP (ref 3.8–10.5)
WBC UR QL: 3 /HPF — SIGNIFICANT CHANGE UP (ref 0–5)

## 2024-04-29 PROCEDURE — 71275 CT ANGIOGRAPHY CHEST: CPT | Mod: 26,MC

## 2024-04-29 PROCEDURE — 99285 EMERGENCY DEPT VISIT HI MDM: CPT

## 2024-04-29 PROCEDURE — 71045 X-RAY EXAM CHEST 1 VIEW: CPT | Mod: 26

## 2024-04-29 PROCEDURE — 93010 ELECTROCARDIOGRAM REPORT: CPT

## 2024-04-29 PROCEDURE — 99053 MED SERV 10PM-8AM 24 HR FAC: CPT

## 2024-04-29 RX ORDER — ACETAMINOPHEN 500 MG
650 TABLET ORAL ONCE
Refills: 0 | Status: COMPLETED | OUTPATIENT
Start: 2024-04-29 | End: 2024-04-29

## 2024-04-29 RX ADMIN — Medication 650 MILLIGRAM(S): at 03:56

## 2024-04-29 NOTE — ED ADULT NURSE NOTE - OBJECTIVE STATEMENT
Pt presented to ER by family with c/o acute onset of unable to move, and to weakness to bilat legs, denies fall, sts she was stuck in standing position, denies chest pain, sob, n/v/d. In ER eval by Provider, labs obtained, will treat per plan of care. ANNE-MARIE Vogel

## 2024-04-29 NOTE — ED PROVIDER NOTE - NSFOLLOWUPINSTRUCTIONS_ED_ALL_ED_FT
COVID-19  COVID-19 is an infection caused by a virus called SARS-CoV-2. This type of virus is called a coronavirus. People with COVID-19 may:  Have little to no symptoms.  Have mild to moderate symptoms that affect their lungs and breathing.  Get very sick.  What are the causes?  The human body, showing how the coronavirus travels from the air to a person's lungs.  COVID-19 is caused by a virus. This virus may be in the air as droplets or on surfaces. It can spread from an infected person when they cough, sneeze, speak, sing, or breathe. You may become infected if:  You breathe in the infected droplets in the air.  You touch an object that has the virus on it.  What increases the risk?  You are at risk of getting COVID-19 if you have been around someone with the infection. You may be more likely to get very sick if:  You are 65 years old or older.  You have certain medical conditions, such as:  Heart disease.  Diabetes.  Chronic respiratory disease.  Cancer.  Pregnancy.  You are immunocompromised. This means your body cannot fight infections easily.  You have a disability or trouble moving, meaning you're immobile.  What are the signs or symptoms?  People may have different symptoms from COVID-19. The symptoms can also be mild to severe. They often show up in 5–6 days after being infected. But they can take up to 14 days to appear. Common symptoms are:  Cough.  Feeling tired.  New loss of taste or smell.  Fever.  Less common symptoms are:  Sore throat.  Headache.  Body or muscle aches.  Diarrhea.  A skin rash or odd-colored fingers or toes.  Red or irritated eyes.  Sometimes, COVID-19 does not cause symptoms.    How is this diagnosed?  COVID-19 can be diagnosed with tests done in the lab or at home. Fluid from your nose, mouth, or lungs will be used to check for the virus.    How is this treated?  Treatment for COVID-19 depends on how sick you are.  Mild symptoms can be treated at home with rest, fluids, and over-the-counter medicines.  Severe symptoms may be treated in a hospital intensive care unit (ICU).  If you have symptoms and are at risk of getting very sick, you may be given a medicine that fights viruses. This medicine is called an antiviral.    How is this prevented?  To protect yourself from COVID-19:  Know your risk factors.  Get vaccinated.  If your body cannot fight infections easily, talk to your provider about treatment to help prevent COVID-19.  Stay at least 1 meter away from others.  Wear a well-fitted mask when:  You can't stay at a distance from people.  You're in a place with poor air flow.  Try to be in open spaces with good air flow when in public.  Wash your hands often or use an alcohol-based hand .  Cover your nose and mouth when coughing and sneezing.  If you think you have COVID-19 or have been around someone who has it, stay home and be by yourself for 5–10 days.    Where to find more information  Centers for Disease Control and Prevention (CDC): cdc.gov  World Health Organization (WHO): who.int  Get help right away if:  You have trouble breathing or get short of breath.  You have pain or pressure in your chest.  You cannot speak or move any part of your body.  You are confused.  Your symptoms get worse.  These symptoms may be an emergency. Get help right away. Call 911.  Do not wait to see if the symptoms will go away.  Do not drive yourself to the hospital.  This information is not intended to replace advice given to you by your health care provider. Make sure you discuss any questions you have with your health care provider. See information below on COVID, follow-up with your primary care doctor and 5 to 10 days as discussed.    You can take 650 mg of Tylenol up to 4 times a day for your symptoms.    Return to the emergency room for any new or worsening symptoms.    COVID-19    COVID-19 is an infection caused by a virus called SARS-CoV-2. This type of virus is called a coronavirus. People with COVID-19 may:  Have little to no symptoms.  Have mild to moderate symptoms that affect their lungs and breathing.  Get very sick.  What are the causes?  The human body, showing how the coronavirus travels from the air to a person's lungs.  COVID-19 is caused by a virus. This virus may be in the air as droplets or on surfaces. It can spread from an infected person when they cough, sneeze, speak, sing, or breathe. You may become infected if:  You breathe in the infected droplets in the air.  You touch an object that has the virus on it.  What increases the risk?  You are at risk of getting COVID-19 if you have been around someone with the infection. You may be more likely to get very sick if:  You are 65 years old or older.  You have certain medical conditions, such as:  Heart disease.  Diabetes.  Chronic respiratory disease.  Cancer.  Pregnancy.  You are immunocompromised. This means your body cannot fight infections easily.  You have a disability or trouble moving, meaning you're immobile.  What are the signs or symptoms?  People may have different symptoms from COVID-19. The symptoms can also be mild to severe. They often show up in 5–6 days after being infected. But they can take up to 14 days to appear. Common symptoms are:  Cough.  Feeling tired.  New loss of taste or smell.  Fever.  Less common symptoms are:  Sore throat.  Headache.  Body or muscle aches.  Diarrhea.  A skin rash or odd-colored fingers or toes.  Red or irritated eyes.  Sometimes, COVID-19 does not cause symptoms.    How is this diagnosed?  COVID-19 can be diagnosed with tests done in the lab or at home. Fluid from your nose, mouth, or lungs will be used to check for the virus.    How is this treated?  Treatment for COVID-19 depends on how sick you are.  Mild symptoms can be treated at home with rest, fluids, and over-the-counter medicines.  Severe symptoms may be treated in a hospital intensive care unit (ICU).  If you have symptoms and are at risk of getting very sick, you may be given a medicine that fights viruses. This medicine is called an antiviral.    How is this prevented?  To protect yourself from COVID-19:  Know your risk factors.  Get vaccinated.  If your body cannot fight infections easily, talk to your provider about treatment to help prevent COVID-19.  Stay at least 1 meter away from others.  Wear a well-fitted mask when:  You can't stay at a distance from people.  You're in a place with poor air flow.  Try to be in open spaces with good air flow when in public.  Wash your hands often or use an alcohol-based hand .  Cover your nose and mouth when coughing and sneezing.  If you think you have COVID-19 or have been around someone who has it, stay home and be by yourself for 5–10 days.    Where to find more information  Centers for Disease Control and Prevention (CDC): cdc.gov  World Health Organization (WHO): who.int  Get help right away if:  You have trouble breathing or get short of breath.  You have pain or pressure in your chest.  You cannot speak or move any part of your body.  You are confused.  Your symptoms get worse.  These symptoms may be an emergency. Get help right away. Call 911.  Do not wait to see if the symptoms will go away.  Do not drive yourself to the hospital.  This information is not intended to replace advice given to you by your health care provider. Make sure you discuss any questions you have with your health care provider.

## 2024-04-29 NOTE — ED PROVIDER NOTE - OBJECTIVE STATEMENT
89-year-old female, past medical history of rheumatoid arthritis, chronic lower extremity edema on Lasix presenting with chief complaint of generalized weakness and right-sided flank pain.  No significant associated symptoms.  Denies abdominal pain or chest pain. Denies any urinary symptoms such as hematuria, dysuria urinary urgency or frequency.  No fevers or chills.  No urinary or bowel incontinence.  Denies any cough or congestion.  No falls.  Has been taking medications as prescribed, no other complaints at this time.

## 2024-04-29 NOTE — ED PROVIDER NOTE - NS ED MD DISPO DISCHARGE
ATTENDING STATEMENT:    INTERVAL EVENTS:    VITAL SIGNS OVER LAST 24 HOURS:  T(C): 36.5 (01-21-20 @ 06:12), Max: 36.9 (01-20-20 @ 09:15)  T(F): 97.7 (01-21-20 @ 06:12), Max: 98.4 (01-20-20 @ 09:15)  HR: 142 (01-21-20 @ 06:12) (131 - 167)  BP: 92/36 (01-21-20 @ 06:12) (82/45 - 117/54)  BP(mean): --  RR: 36 (01-21-20 @ 06:12) (36 - 40)  SpO2: 100% (01-21-20 @ 06:12) (99% - 100%)    Gen: no apparent distress, appears comfortable, sleeping comfortably in moms arms  HEENT: normocephalic/atraumatic, moist mucous membranes, NG tube noted in nare  Neck: supple  Heart: S1S2+, regular rate and rhythm, no murmur, cap refill < 2 sec, 2+ peripheral pulses, tunneled catheter noted, c/d/i  Lungs: normal respiratory pattern, clear to auscultation bilaterally  Abd: soft, nontender, nondistended, bowel sounds present  : deferred  Ext: full range of motion, no edema, no tenderness  Neuro: no focal deficits, awake, alert, no acute change from baseline exam  Skin: no rash, intact and not indurated    WEIGHT TRENDS:  1/21 - 5590g  1/17 - 5515g  1/13 - 5355g  1/5 - 5900g    A/P: JORGE RAM is a 2m 1male with sickle cell trait admitted with diarrhea, dehydration and weight loss likely secondary to milk protein allergy (FOBT+ with willis blood and mucous in stools, GI PCR neg, stool cx neg). Hospital course notable for need for TPN and NG tube feeds to provide nutrition as slow enteral feeds advanced.  Continues to require inpatient management for nutrition, hydration, and close monitoring of weights, stooling pattern, and blood pressures.    1. PRESUMED MILK PROTEIN ALLERGY  -stopped TPN yesterday 1/20  -NG feeds reached goal of continuous 34cc/hr; yesterday trialed PO/NG; will continue to slowly advance today  -Strict I/Os, daily weights  -daily UA for spec gravity per GI recommendations-> will d/w them if this is still necessary   -f/u stool pH and electrolytes     2. HTN  -EKG NSR, echo reassuring, renal US negative for renal artery stenosis, TFTs normal for age  -Continue amlodipine - nephrology aware  -hydralazine q6h as needed for SBP >115 per nephro recommendations (received once on 1/15)    3. Mild fullness of left renal pelvis: can be followed as outpatient by nephro     4. ANEMIA: likely physiologic wil, most recent Hb 9.1  on 1/13 (improved from 7.7).  If patient continues to have bloody stools will repeat CBC    5. Possible abdominal cyst on prenatal US: Abdominal US here neg for any intra-abdominal cystic lesion    6. BANDEMIA (11% on admission): Bcx neg on 1/5; if febrile consider sending CBC, BCx, UA and Ucx    Access: tunnelled central venous catheter via left IJ--> will plan to discontinue later today if pt is able to tolerate switching to bolus feeds    Anticipated Discharge Date:  1-2 days  [ ] Social Work needs:  [ ] Case management needs: NG feed equipment for home  [ ] Other discharge needs:    Family Centered Rounds completed with parents and nursing.   I have read and agree with this Progress Note.  I examined the patient this morning and agree with above resident physical exam, with edits made where appropriate.  I was physically present for the evaluation and management services provided.     [x ] Reviewed lab results  [ ] Reviewed Radiology  [x] Spoke with parents/guardian  [x] Spoke with consultant    Anshul Stevens MD   Pediatric Hospitalist ATTENDING STATEMENT:    INTERVAL EVENTS:  tolerated bolus feedings and was able to increase the oral feeding volume.  Dad is concerned that between 30 min PO attempt + 2 hr NG drip, may not be hungry at the time for the next feed.  No stooling in 12+ hours.    VITAL SIGNS OVER LAST 24 HOURS:  T(C): 36.5 (01-21-20 @ 06:12), Max: 36.9 (01-20-20 @ 09:15)  T(F): 97.7 (01-21-20 @ 06:12), Max: 98.4 (01-20-20 @ 09:15)  HR: 142 (01-21-20 @ 06:12) (131 - 167)  BP: 92/36 (01-21-20 @ 06:12) (82/45 - 117/54)  BP(mean): --  RR: 36 (01-21-20 @ 06:12) (36 - 40)  SpO2: 100% (01-21-20 @ 06:12) (99% - 100%)    Gen: no apparent distress, appears comfortable, sleeping comfortably in crib; Dad at bedside  HEENT: normocephalic/atraumatic, moist mucous membranes, NG tube noted in nare  Neck: supple  Heart: S1S2+, regular rate and rhythm, no murmur, cap refill < 2 sec, 2+ peripheral pulses, tunneled catheter noted, c/d/i  Lungs: normal respiratory pattern, clear to auscultation bilaterally  Abd: soft, nontender, nondistended, bowel sounds present  : deferred  Ext: full range of motion, no edema, no tenderness  Neuro: no focal deficits, awake, alert, no acute change from baseline exam  Skin: no rash, intact and not indurated    WEIGHT TRENDS:  1/21 - 5590g  1/17 - 5515g  1/13 - 5355g  1/5 - 5900g    A/P: JORGE RAM is a 2m 1male with sickle cell trait admitted with diarrhea, dehydration and weight loss likely secondary to milk protein allergy (FOBT+ with willis blood and mucous in stools, GI PCR neg, stool cx neg). Hospital course notable for need for TPN and NG tube feeds to provide nutrition as slow enteral feeds advanced.  Continues to require inpatient management for nutrition, hydration, and close monitoring of weights, stooling pattern, and blood pressures.    1. PRESUMED MILK PROTEIN ALLERGY  -stopped TPN yesterday 1/20  -NG feeds reached goal of continuous 34cc/hr; yesterday trialed PO/NG; will continue to slowly advance today - given Dad's concern about hunger and feeding readiness, we adjusted the feeding to allow up to 90cc PO x 30 min, then the remainder via NG over 1hr (this will shorten his feeding time from 2.5hrs=>1.5hrs and allow more time between feeds to develop hunger, etc.)  -Strict I/Os, daily weights  -daily UA for spec gravity per GI recommendations-> will d/w them if this is still necessary  -f/u stool pH and electrolytes     2. HTN  -EKG NSR, echo reassuring, renal US negative for renal artery stenosis, TFTs normal for age  -Continue amlodipine - nephrology aware  -hydralazine q6h as needed for SBP >115 per nephro recommendations (received once on 1/15)    3. Mild fullness of left renal pelvis: can be followed as outpatient by nephro     4. ANEMIA: likely physiologic wil, most recent Hb 9.1  on 1/13 (improved from 7.7).  If patient continues to have bloody stools will repeat CBC    5. Possible abdominal cyst on prenatal US: Abdominal US here neg for any intra-abdominal cystic lesion    6. BANDEMIA (11% on admission): Bcx neg on 1/5; if febrile consider sending CBC, BCx, UA and Ucx    Access: tunnelled central venous catheter via left IJ--> will plan to discontinue later today if pt is able to tolerate switching to bolus feeds    Anticipated Discharge Date: 1-2 days  [ ] Social Work needs:  [ ] Case management needs:  [ ] Other discharge needs: do NOT anticipate home NG feeds    Family Centered Rounds completed with Dad and nursing.     [x] Reviewed lab results  [ ] Reviewed Radiology  [x] Spoke with parents/guardian  [x] Spoke with consultant    Communication with Primary Care Physician:  Date/Time: 01-21-20 @ 12:51  Hospital day #: 16d  Person Contacted: Dr. Parker  Type of Communication: [ x] Interim Update   Method of Contact: [ x] Phone     Anshul Stevens MD   Pediatric Hospitalist Home

## 2024-04-29 NOTE — ED PROVIDER NOTE - CARE PLAN
1 Principal Discharge DX:	Weakness   Principal Discharge DX:	Weakness  Secondary Diagnosis:	2019 novel coronavirus disease (COVID-19)

## 2024-04-29 NOTE — ED ADULT NURSE NOTE - NSFALLRISKASMT_ED_ALL_ED_DT
Physical Therapy Progress Note     Visit Count: 9  Plan of Care Dates: Initial: 12/8/2017 Through: 4/13/2018     Insurance Information: Work Injury Information:   ID#: [id number]  Group#: N/A      DOI: 9/28/17  Employer: [employer]   : JAMES MORALES  #: [phone]  Fax#: [phone]  Claim Status: Open  Claim Compensable: Yes  Pre auth/cert required: No *-* requesting copy of order & eval   Secondary Insurance? [facility]      Restrictions: \"Restrictions as of 12/7/17 are no lifting greater than 2 lbs from chest level and below with the operative arm right arm. No holding on to stairs with right arm or going up and down stairs due to safety concerns with increased chance of falling.   As of 12/7/17 may return to work for 4 hour shifts max x 1 week and then will increase by 1 hour each week until up to max of 8 hour shifts. 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 [facility]  2. N/A     Date of Surgery: 10/27/17;   12 weeks s/p surgery on 1/19/18  Surgery performed: 1.  Arthroscopic rotator cuff repair, massive rotator cuff tear with 3 triple loaded PEEK anchors backup PushLock type fixation.  2.  Subacromial decompression.  3.  Extensive debridement glenohumeral joint, including biceps tenotomy, debridement posterior labral tear, posterior glenoid chondral tear.  4.  Distal clavicle excision.;   Physician Guidelines: yes - Type III RTC     Diagnosis Precautions: none  Chart reviewed: Relevant co-morbidities, allergies,  tests and medications:   History - past medical        Past Medical History:   Diagnosis Date   • Acne vulgaris     • ADD (attention deficit disorder)     • Candidal intertrigo     • Cataract     • Depression     • GERD (gastroesophageal reflux disease)     • HLD (hyperlipidemia)     • HTN (hypertension)     • Hypothyroidism     • Meniere's disease     • Metabolic syndrome     • Morbid obesity with BMI of 50.0-59.9, adult (CMS/HCC)     • ROYCE (obstructive sleep apnea)       uses no devices   • Osteoarthritis           History - past surgical         Past Surgical History:   Procedure Laterality Date   • BREAST SURGERY         breast reduction   • COLONOSCOPY W/ POLYPECTOMY   04/23/2007   • D AND C   for DUB   • REMOVAL ANAL FISTULA INTERSPHINCTERIC       • SHOULDER ARTHROSCOPY Right 10/27/2017            SUBJECTIVE   Patient states she is ok and the pain is improved today.    Current Pain: 0/10.    Functional Change:    Easier to put on deodorant, use arm to eat.      OBJECTIVE   Range of Motion (degrees)    Norm Left Right Right   Shoulder                    Date   Initial Initial  PROM Current   Flexion 170-180 160 90 164 deg   Extension 50-60 NT NT    Abduction 170-180 161 90 145   Internal Rotation   Apley's: T10 49 Deg at 45 deg abduction 59 deg  At 90 deg abduction   External  Rotation 80-90 Apley's: superior /medial scapular border 35 deg at 45 deg abduction 69 deg at 90 deg abduction   standard testing positions unless otherwise noted; Key: ranges are reported in active range of motion unless noted as AA=active assistive or P=passive range of motion, * denotes pain   Comments: NA    Treatment    Therapeutic Exercise:   Pulleys   - flexion  - abduction  AAROM in supine with 2# then 5# dowel:  - flexion x 10 with each  Side lying abduction x 10    Manual therapy:  Manual resistance to ER isometric in side lying 5 x 10 sec and supine 5 x 10 sec   - manual cueing for scapular positioning  Rhythmic  stabilization:  - supine 90 deg flexion - poor to fair x 2  - supine IR/ER at 45 and 60 deg abduction - fair stability  Manual assist to supine R UE press x 15  Manual assist to supine flexion with manual scapular upward rotation     Current Home Program (not performed this date except as noted above):   All done at a minimum 3x/day  Scapular retraction  AAROM with dowel:  - bench press  - flexion - wide and narrow   - standing extension  - B ER/IR  Table slides:    - flexion    - ER  Work exercise:  - 1/2/18  - counter flexion stretch  - ER door way stretch  - cross body adduction stretch  - door way pec stretch at 60 deg  B scapular retraction against corner     ASSESSMENT   Patient has made good progress with PROM - flexion to 164, abduction to 145, and ER to 69 deg at 45  In scapular plane.  Patient continues to lack sufficient scapular and RTC strength to produce quality motion against gravity so exercises are beginning to focus on this in therapy.  Patient's HEP thus far has focused on improving PROM with stretching since the patient had difficulty progressing this early in her rehab.     Pain after treatment: sore/10  Result of above outlined education: Verbalizes understanding, Demonstrates understanding and Needs reinforcement    Goals:      To be obtained by end of this plan of care:  1. Patient independent with modified and progressed home exercise program.  2. Patient will decrease involved shoulder pain/symptoms to <3/10  to aid in completion of self-care tasks/dressing, completion of household tasks for independent living, age appropriate activities.   3. Patient will increase involved shoulder active range of motion to abduction 150°, flexion 150° to aid in normalization of upper extremity movements to aid activities of independent daily living, tolerating repetitive overhead/upper extremity activity.   4. Patient will increase involved shoulder strength to 4+/5 to aid in age appropriate  activities, lifting for work activities.  5. Patient will be able to sleep 6 hours without disruption from pain.   6. Patient/Client will be able to lift 30 pounds from floor to waist with proper body mechanics to assist with lifting activities at work.  7. Patient/Client will be able to lift 15 pounds from waist to eye level with proper body mechanics to assist with lifting overhead activities at work  8. DASH: Patient will complete form to reflect an improved score from initial score of 76.67 to less than or equal to 45 (scored 0-100; a higher score indicates greater disability) to indicate pt reported improvement in function/disability/impairment (minimal detectable change: 15 points).     PLAN   Plan for next session:   Continue to work on scapular positioning, RTC strengthening against manual resistance while avoiding compensatory patterns.    Give band for scapular strengthening for HEP    THERAPY DAILY BILLING   Primary Insurance:  [facility]  Secondary Insurance: N/A    Evaluation Procedures:  No evaluation codes were used on this date of service    Timed Procedures:  Manual Therapy, 25 minutes  Therapeutic Exercise, 18 minutes    Untimed Procedures:  No untimed codes were used on this date of service    Total Treatment Time: 43  minutes    The referring provider's electronic or written signature on the evaluation authorizes the therapy plan of care.     29-Apr-2024 01:36

## 2024-04-29 NOTE — ED PROVIDER NOTE - CLINICAL SUMMARY MEDICAL DECISION MAKING FREE TEXT BOX
Jossy Pizarro, ED Attending : 89-year-old female, presenting with chief complaint of generalized weakness.  And some right-sided back pain.  On exam, vital signs stable, no focal exam findings other than paraspinal tenderness to palpation of muscles of the thoracolumbar spine.  Patient overall well-appearing.  EKG normal sinus rhythm with no signs of ischemia.  Plan for basic labs to assess for possible infectious or metabolic etiology of symptoms.  Will check urine for possible UTI or Wilder given area of tenderness along the back.  will obtain screening viral swab, do not suspect  intra-abdominal  Etiology at this time given exam and story -  no indication for imaging at this time.  Reassess to dispo.

## 2024-04-29 NOTE — ED PROVIDER NOTE - PATIENT PORTAL LINK FT
You can access the FollowMyHealth Patient Portal offered by [facility] by registering at the following website: [email or web address]. By joining [facility]’s FollowMyHealth portal, you will also be able to view your health information using other applications (apps) compatible with our system.

## 2024-04-29 NOTE — ED PROVIDER NOTE - SECONDARY DIAGNOSIS.
Spoke with patient regarding hepatology lab results and US Liver results. LFTs improved. Creatinine improved, down to baseline. Hgb stable / low at 10.7. Iron saturation low. US Liver noted possible early signs of cirrhosis and portal HTN, no HCC. Recommend adding iron supplement daily. Continue weight loss efforts and avoid EtOH use. Follow up as scheduled in 4/2020 with labs prior.    2019 novel coronavirus disease (COVID-19)

## 2024-04-29 NOTE — ED PROVIDER NOTE - PHYSICAL EXAMINATION
Const: Well-nourished, Well-developed, appearing stated age.  Eyes: no conjunctival injection, and symmetrical lids.  HEENT: Head NCAT, no lesions. Atraumatic external nose and ears.   Neck: Symmetric, trachea midline.   CVS: +S1/S2, Radial and DP pulses 2+ b/l.   RESP: Unlabored respiratory effort. Clear to auscultation bilaterally.  GI: Nontender/Nondistended, No CVA tenderness b/l.   MSK: Normocephalic/Atraumatic, Lower Extremities w/ pitting edema b/l.  no midline tenderness of the C, T, L-spine.  Some paraspinal tenderness in the thoracolumbar spine on the right.   Skin: Warm, dry and intact.   Neuro: Fluent Speech. Moving all extremities  With equal strength in the bilateral lower and upper extremities.   Patient to light touch intact in all extremities.  Psych: Awake, Alert, & Oriented (AAO) x3. Appropriate mood and affect.

## 2024-04-29 NOTE — ED ADULT TRIAGE NOTE - CHIEF COMPLAINT QUOTE
Pt c/o bilateral leg weakness tonight. B/L edema noted. No neuro deficits. Denies chest pain, sob, abd pain, n/v/d, fevers/chills. Hx: Arthritis

## 2024-04-29 NOTE — ED ADULT NURSE NOTE - NSFALLHARMRISKINTERV_ED_ALL_ED
Assistance OOB with selected safe patient handling equipment if applicable/Assistance with ambulation/Communicate risk of Fall with Harm to all staff, patient, and family/Monitor gait and stability/Provide visual cue: red socks, yellow wristband, yellow gown, etc/Reinforce activity limits and safety measures with patient and family/Bed in lowest position, wheels locked, appropriate side rails in place/Call bell, personal items and telephone in reach/Instruct patient to call for assistance before getting out of bed/chair/stretcher/Non-slip footwear applied when patient is off stretcher/[place] to call system/Physically safe environment - no spills, clutter or unnecessary equipment/Purposeful Proactive Rounding/Room/bathroom lighting operational, light cord in reach

## 2024-04-29 NOTE — ED PROVIDER NOTE - PROGRESS NOTE DETAILS
Low-grade temp rectally, patient does not endorse any coughing.  Questionable area of focality on chest x-ray.  But will obtain CT angio to assess for possible PE versus pneumonia.  Reassess results and symptoms to dispo. Jossy Pizarro, ED Attending Pt Covid positive. CXR: "Patchy opacities throughout the right lung concerning for pneumonia". CT PE study ordered given back pain and concerning CXR findings Patient updated on all findings, comfortable going home at this time.  CT does not show any evidence of PE or pneumonia.  Labs nonactionable.  Will give patient return precautions and follow-up instructions with teach back.  Plan for DC. Jossy Pizarro, ED Attending

## 2024-04-30 LAB
CULTURE RESULTS: SIGNIFICANT CHANGE UP
SPECIMEN SOURCE: SIGNIFICANT CHANGE UP

## 2024-05-04 LAB
CULTURE RESULTS: SIGNIFICANT CHANGE UP
CULTURE RESULTS: SIGNIFICANT CHANGE UP
SPECIMEN SOURCE: SIGNIFICANT CHANGE UP
SPECIMEN SOURCE: SIGNIFICANT CHANGE UP

## 2024-10-29 NOTE — PHYSICAL THERAPY INITIAL EVALUATION ADULT - ADDITIONAL COMMENTS
2
Pt lives in house with daughter. Pt has 2 steps to enter. No steps once inside. ambulates with cane at baseline. Pt was independent in functional activities prior to admission. Pt's daughter is around most of the time, but pt is able to care for self independently. Pt rarely leaves household (so does not often negotiate steps).     Pt left seated in bedside chair, NAD. +call bell. RN aware of session.

## 2024-11-04 ENCOUNTER — EMERGENCY (EMERGENCY)
Facility: HOSPITAL | Age: 89
LOS: 0 days | Discharge: HOME | End: 2024-11-04
Attending: EMERGENCY MEDICINE
Payer: MEDICARE

## 2024-11-04 VITALS
HEIGHT: 62 IN | OXYGEN SATURATION: 96 % | HEART RATE: 83 BPM | DIASTOLIC BLOOD PRESSURE: 75 MMHG | WEIGHT: 169.98 LBS | TEMPERATURE: 98 F | RESPIRATION RATE: 18 BRPM | SYSTOLIC BLOOD PRESSURE: 134 MMHG

## 2024-11-04 VITALS
OXYGEN SATURATION: 97 % | SYSTOLIC BLOOD PRESSURE: 164 MMHG | TEMPERATURE: 98 F | HEART RATE: 79 BPM | DIASTOLIC BLOOD PRESSURE: 91 MMHG | RESPIRATION RATE: 20 BRPM

## 2024-11-04 DIAGNOSIS — Z98.890 OTHER SPECIFIED POSTPROCEDURAL STATES: Chronic | ICD-10-CM
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
## 2024-11-04 DIAGNOSIS — S80.851A SUPERFICIAL FOREIGN BODY, RIGHT LOWER LEG, INITIAL ENCOUNTER: ICD-10-CM
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
## 2024-11-04 DIAGNOSIS — I73.9 PERIPHERAL VASCULAR DISEASE, UNSPECIFIED: ICD-10-CM

## 2024-11-04 DIAGNOSIS — M06.9 RHEUMATOID ARTHRITIS, UNSPECIFIED: ICD-10-CM

## 2024-11-04 LAB
ALBUMIN SERPL ELPH-MCNC: 3.8 G/DL — SIGNIFICANT CHANGE UP (ref 3.3–5)
ALP SERPL-CCNC: 100 U/L — SIGNIFICANT CHANGE UP (ref 40–120)
ALT FLD-CCNC: 21 U/L — SIGNIFICANT CHANGE UP (ref 12–78)
ANION GAP SERPL CALC-SCNC: 6 MMOL/L — SIGNIFICANT CHANGE UP (ref 5–17)
APPEARANCE UR: CLEAR — SIGNIFICANT CHANGE UP
APTT BLD: 35.2 SEC — SIGNIFICANT CHANGE UP (ref 24.5–35.6)
AST SERPL-CCNC: 28 U/L — SIGNIFICANT CHANGE UP (ref 15–37)
BASOPHILS # BLD AUTO: 0.03 K/UL — SIGNIFICANT CHANGE UP (ref 0–0.2)
BASOPHILS NFR BLD AUTO: 0.5 % — SIGNIFICANT CHANGE UP (ref 0–2)
BILIRUB SERPL-MCNC: 0.4 MG/DL — SIGNIFICANT CHANGE UP (ref 0.2–1.2)
BILIRUB UR-MCNC: NEGATIVE — SIGNIFICANT CHANGE UP
BUN SERPL-MCNC: 15 MG/DL — SIGNIFICANT CHANGE UP (ref 7–23)
CALCIUM SERPL-MCNC: 9.4 MG/DL — SIGNIFICANT CHANGE UP (ref 8.5–10.1)
CHLORIDE SERPL-SCNC: 102 MMOL/L — SIGNIFICANT CHANGE UP (ref 96–108)
CO2 SERPL-SCNC: 33 MMOL/L — HIGH (ref 22–31)
COLOR SPEC: YELLOW — SIGNIFICANT CHANGE UP
CREAT SERPL-MCNC: 0.54 MG/DL — SIGNIFICANT CHANGE UP (ref 0.5–1.3)
DIFF PNL FLD: NEGATIVE — SIGNIFICANT CHANGE UP
EGFR: 86 ML/MIN/1.73M2 — SIGNIFICANT CHANGE UP
EOSINOPHIL # BLD AUTO: 0.09 K/UL — SIGNIFICANT CHANGE UP (ref 0–0.5)
EOSINOPHIL NFR BLD AUTO: 1.5 % — SIGNIFICANT CHANGE UP (ref 0–6)
GLUCOSE SERPL-MCNC: 79 MG/DL — SIGNIFICANT CHANGE UP (ref 70–99)
GLUCOSE UR QL: NEGATIVE MG/DL — SIGNIFICANT CHANGE UP
HCT VFR BLD CALC: 37.2 % — SIGNIFICANT CHANGE UP (ref 34.5–45)
HGB BLD-MCNC: 11.8 G/DL — SIGNIFICANT CHANGE UP (ref 11.5–15.5)
IMM GRANULOCYTES NFR BLD AUTO: 0.2 % — SIGNIFICANT CHANGE UP (ref 0–0.9)
INR BLD: 0.97 RATIO — SIGNIFICANT CHANGE UP (ref 0.85–1.16)
KETONES UR-MCNC: NEGATIVE MG/DL — SIGNIFICANT CHANGE UP
LACTATE SERPL-SCNC: 1.7 MMOL/L — SIGNIFICANT CHANGE UP (ref 0.7–2)
LEUKOCYTE ESTERASE UR-ACNC: NEGATIVE — SIGNIFICANT CHANGE UP
LYMPHOCYTES # BLD AUTO: 2.31 K/UL — SIGNIFICANT CHANGE UP (ref 1–3.3)
LYMPHOCYTES # BLD AUTO: 38.2 % — SIGNIFICANT CHANGE UP (ref 13–44)
MCHC RBC-ENTMCNC: 24.1 PG — LOW (ref 27–34)
MCHC RBC-ENTMCNC: 31.7 G/DL — LOW (ref 32–36)
MCV RBC AUTO: 76.1 FL — LOW (ref 80–100)
MONOCYTES # BLD AUTO: 0.78 K/UL — SIGNIFICANT CHANGE UP (ref 0–0.9)
MONOCYTES NFR BLD AUTO: 12.9 % — SIGNIFICANT CHANGE UP (ref 2–14)
NEUTROPHILS # BLD AUTO: 2.82 K/UL — SIGNIFICANT CHANGE UP (ref 1.8–7.4)
NEUTROPHILS NFR BLD AUTO: 46.7 % — SIGNIFICANT CHANGE UP (ref 43–77)
NITRITE UR-MCNC: NEGATIVE — SIGNIFICANT CHANGE UP
NRBC # BLD: 0 /100 WBCS — SIGNIFICANT CHANGE UP (ref 0–0)
PH UR: 7.5 — SIGNIFICANT CHANGE UP (ref 5–8)
PLATELET # BLD AUTO: 379 K/UL — SIGNIFICANT CHANGE UP (ref 150–400)
POTASSIUM SERPL-MCNC: 4.3 MMOL/L — SIGNIFICANT CHANGE UP (ref 3.5–5.3)
POTASSIUM SERPL-SCNC: 4.3 MMOL/L — SIGNIFICANT CHANGE UP (ref 3.5–5.3)
PROT SERPL-MCNC: 8.3 GM/DL — SIGNIFICANT CHANGE UP (ref 6–8.3)
PROT UR-MCNC: NEGATIVE MG/DL — SIGNIFICANT CHANGE UP
PROTHROM AB SERPL-ACNC: 11 SEC — SIGNIFICANT CHANGE UP (ref 9.9–13.4)
RBC # BLD: 4.89 M/UL — SIGNIFICANT CHANGE UP (ref 3.8–5.2)
RBC # FLD: 15.7 % — HIGH (ref 10.3–14.5)
SODIUM SERPL-SCNC: 141 MMOL/L — SIGNIFICANT CHANGE UP (ref 135–145)
SP GR SPEC: 1.01 — SIGNIFICANT CHANGE UP (ref 1–1.03)
UROBILINOGEN FLD QL: 0.2 MG/DL — SIGNIFICANT CHANGE UP (ref 0.2–1)
WBC # BLD: 6.04 K/UL — SIGNIFICANT CHANGE UP (ref 3.8–10.5)
WBC # FLD AUTO: 6.04 K/UL — SIGNIFICANT CHANGE UP (ref 3.8–10.5)

## 2024-11-04 PROCEDURE — 71046 X-RAY EXAM CHEST 2 VIEWS: CPT | Mod: 26

## 2024-11-04 PROCEDURE — 99284 EMERGENCY DEPT VISIT MOD MDM: CPT

## 2024-11-04 PROCEDURE — 99285 EMERGENCY DEPT VISIT HI MDM: CPT

## 2024-11-04 PROCEDURE — 93010 ELECTROCARDIOGRAM REPORT: CPT

## 2024-11-04 PROCEDURE — 73590 X-RAY EXAM OF LOWER LEG: CPT | Mod: 26,RT

## 2024-11-04 RX ORDER — MORPHINE SULFATE 30 MG/1
2 TABLET, EXTENDED RELEASE ORAL ONCE
Refills: 0 | Status: DISCONTINUED | OUTPATIENT
Start: 2024-11-04 | End: 2024-11-04

## 2024-11-04 RX ORDER — VANCOMYCIN HYDROCHLORIDE 50 MG/ML
1000 KIT ORAL ONCE
Refills: 0 | Status: COMPLETED | OUTPATIENT
Start: 2024-11-04 | End: 2024-11-04

## 2024-11-04 RX ADMIN — MORPHINE SULFATE 2 MILLIGRAM(S): 30 TABLET, EXTENDED RELEASE ORAL at 18:28

## 2024-11-04 RX ADMIN — VANCOMYCIN HYDROCHLORIDE 250 MILLIGRAM(S): KIT at 18:28

## 2024-11-04 NOTE — ED PROVIDER NOTE - CLINICAL SUMMARY MEDICAL DECISION MAKING FREE TEXT BOX
89F p/w wound on R shin  -concern for infected wound; given b/l le edema also slight concern for new chf or oscar  -ecg, monitor  -cbc, cmp, coags, lactate, trop, bnp, blood cx x 2  -xr R shin, cxr  -empiric iv abx  -wound care consult

## 2024-11-04 NOTE — ED ADULT TRIAGE NOTE - PATIENT ON (OXYGEN DELIVERY METHOD)
[place] Border Text: The closure involved the vermilion border, WHICH IS A FREE MARGIN OF THE LIP AND THEREFORE INDICATION FOR COMPLEX REPAIR. room air

## 2024-11-04 NOTE — ED PROVIDER NOTE - PROGRESS NOTE DETAILS
appreciate wound consult from Dr. Figueroa and ANTONIO Jaffe who don't think wound is infected and they will follow the pt in wound care clinic. - Stewart PATTERSON

## 2024-11-04 NOTE — ED ADULT NURSE NOTE - PAIN: PRESENCE, MLM
Detail Level: Zone
Plan: Follow up in 3 months\\nAvoid all irritants including fragrances and alcohol based products.
Render In Strict Bullet Format?: No
Continue Regimen: triamcinolone acetonide 0.1 % topical cream, Apply to affected areas of the hands twice a day x 14 days, stop x 7 days, repeat as needed
complains of pain/discomfort

## 2024-11-04 NOTE — ED PROVIDER NOTE - CARE PROVIDER_API CALL
Victoria Figueroa  Surgery  [address] [place] [id number]  Phone: [phone]  Fax: [phone]  Established Patient  Follow Up Time: 4-6 Days

## 2024-11-04 NOTE — ED PROVIDER NOTE - CARDIAC, MLM
Normal rate, regular rhythm.  Heart sounds S1, S2.  No murmurs, rubs or gallops. DP pulses 2+ and equal bilaterally. Bilateral pitting edema of legs.

## 2024-11-04 NOTE — ED PROVIDER NOTE - SKIN, MLM
Skin normal color for race, warm, dry. There is a wound on the distal R shin that is tender with yellow drainage.

## 2024-11-04 NOTE — ED ADULT TRIAGE NOTE - CHIEF COMPLAINT QUOTE
c/o R leg wound with blister that opened up, has had wound care at home q2 times a week but daughter reports it looks worse. Pt AOx4. PMH heart murmur.

## 2024-11-04 NOTE — ED PROVIDER NOTE - OBJECTIVE STATEMENT
About 4wk ago the patient had a blister on the distal right shin burst and it has been draining clear fluid since. She initially went to urgent care and was then referred to primary care who set him up with visiting nurses who have been dressing his wound, but her family thinks its getting worse so she was brought here because she has seen Dr. Figueroa before for a different wound that healed.

## 2024-11-04 NOTE — CONSULT NOTE ADULT - ASSESSMENT
89 y/o female PMHx LE edema on lasix w VNS 2x/week (previously seen by Carolina at wound care in Oct '23), PAD, RA, spinal surgery, hysterectomy presents for RLE wound evaluation.    Plan:  Follow up with Dr. Figueroa in wound care  Discussed with Dr. Figueroa

## 2024-11-04 NOTE — CONSULT NOTE ADULT - SUBJECTIVE AND OBJECTIVE BOX
GENERAL SURGERY CONSULT NOTE    Patient is a 89 year old  Female who presents with a chief complaint of wound evaluation.    HPI: 89 y/o female PMHx LE edema on lasix w VNS 2x/week (previously seen by Carolina at wound care in Oct '23), PAD, RA, spinal surgery, hysterectomy presents for RLE wound evaluation. Pt has had wound for 3-4 weeks and it has been draining serous fluid during that time period. Had mild pain at the wound last night so came to the ER today for evaluation. Pain is controlled on OTC pain medication. Patient denies fever, chills, nausea, vomiting, constipation, diarrhea, melena, hematochezia, dysuria, hematuria, chest pain, shortness of breath, dizziness, cough. Pt denies trauma. Pt has been ambulating. Seen and examined at bedside with daughter present.     REVIEW OF SYSTEMS:  CONSTITUTIONAL: No fever, weight loss, or fatigue  EYES: No eye pain, visual disturbances, discharge  ENMT:  No difficulty hearing, tinnitus, vertigo; No sinus or throat pain  NECK: No pain or stiffness  BREASTS: No pain, masses, or nipple discharge  RESPIRATORY: No cough, wheezing, chills or hemoptysis; No shortness of breath  CARDIOVASCULAR: No chest pain, palpitations, dizziness, or leg swelling  GASTROINTESTINAL: No abdominal or epigastric pain. No nausea, vomiting, or hematemesis; No diarrhea or constipation. No melena or hematochezia.  GENITOURINARY: No dysuria, frequency, hematuria, or incontinence  NEUROLOGICAL: No headaches, memory loss, loss of strength, numbness, or tremors  SKIN: No itching, burning, rashes, or lesions   LYMPH NODES: No enlarged glands  ENDOCRINE: No heat or cold intolerance; No hair loss  MUSCULOSKELETAL: No joint pain or swelling; No muscle, back, or extremity pain  PSYCHIATRIC: No depression, anxiety, mood swings, or difficulty sleeping  HEME/LYMPH: No easy bruising, or bleeding gums  ALLERY AND IMMUNOLOGIC: No hives or eczema     PAST MEDICAL & SURGICAL HISTORY:  Rheumatoid arthritis  PAD (peripheral artery disease)  H/O: hysterectomy  H/O Spinal surgery      MEDICATIONS: See outpatient home medication.     Allergies  No Known Allergies    SOCIAL HISTORY: Denies tobacco, alcohol, illicit drug use.            FAMILY HISTORY: No known family hx.     Vital Signs Last 24 Hrs  T(C): 36.6 (2024 20:09), Max: 36.6 (2024 20:09)  T(F): 97.8 (2024 20:09), Max: 97.8 (2024 20:09)  HR: 79 (2024 20:09) (79 - 83)  BP: 164/91 (2024 20:09) (134/75 - 164/91)  BP(mean): --  RR: 20 (2024 20:09) (18 - 20)  SpO2: 97% (2024 20:09) (96% - 97%)    Parameters below as of 2024 20:09  Patient On (Oxygen Delivery Method): room air    PHYSICAL EXAM:  CONSTITUTIONAL: NAD, well-developed  HEAD: Atraumatic, Normocephalic  EYES: Conjunctiva and sclera clear  ENMT: No tonsillar erythema, exudates, or enlargement; Moist mucous membranes, No lesions  NECK: Supple, No JVD, Normal thyroid  NERVOUS SYSTEM:  Alert & Oriented X3  RESPIRATORY: Clear to auscultation bilaterally; No rales, rhonchi, wheezing  CARDIOVASCULAR: Regular rate and rhythm. S1S2  GASTROINTESTINAL: Nondistended, +BS, soft, nontender, no guarding, no rigidity   MUSCULOSKELETAL: RLE superficial shin wound pink and clean. No signs of infection. No drainage currently. 2+ DP and PT Pulses b/l. NV intact. Compartments soft, nontender, +ROM, +SILT b/l.     LABS:                        11.8   6.04  )-----------( 379      ( 2024 17:33 )             37.2     11-04    141  |  102  |  15  ----------------------------<  79  4.3   |  33[H]  |  0.54    Ca    9.4      2024 17:33    TPro  8.3  /  Alb  3.8  /  TBili  0.4  /  DBili  x   /  AST  28  /  ALT  21  /  AlkPhos  100  11-04    PT/INR - ( 2024 17:33 )   PT: 11.0 sec;   INR: 0.97 ratio      PTT - ( 2024 17:33 )  PTT:35.2 sec    Urinalysis Basic - ( 2024 20:03 )    Color: Yellow / Appearance: Clear / S.007 / pH: x  Gluc: x / Ketone: Negative mg/dL  / Bili: Negative / Urobili: 0.2 mg/dL   Blood: x / Protein: Negative mg/dL / Nitrite: Negative   Leuk Esterase: Negative / RBC: x / WBC x   Sq Epi: x / Non Sq Epi: x / Bacteria: x     GENERAL SURGERY CONSULT NOTE    Patient is a 89 year old  Female who presents with a chief complaint of wound evaluation.    HPI: 89 y/o female PMHx LE edema on lasix w VNS 2x/week (previously seen by Carolina at wound care in Oct '23), PAD, RA, spinal surgery, hysterectomy presents for RLE wound evaluation. Pt has had wound for 3-4 weeks and it has been draining serous fluid during that time period. Had mild pain at the wound last night so came to the ER today for evaluation. Pain is controlled on OTC pain medication. Patient denies fever, chills, nausea, vomiting, constipation, diarrhea, melena, hematochezia, dysuria, hematuria, chest pain, shortness of breath, dizziness, cough. Pt denies trauma. Pt has been ambulating. Seen and examined at bedside with daughter present.     REVIEW OF SYSTEMS:  CONSTITUTIONAL: No fever, weight loss, or fatigue  EYES: No eye pain, visual disturbances, discharge  ENMT:  No difficulty hearing, tinnitus, vertigo; No sinus or throat pain  NECK: No pain or stiffness  BREASTS: No pain, masses, or nipple discharge  RESPIRATORY: No cough, wheezing, chills or hemoptysis; No shortness of breath  CARDIOVASCULAR: No chest pain, palpitations, dizziness, or leg swelling  GASTROINTESTINAL: No abdominal or epigastric pain. No nausea, vomiting, or hematemesis; No diarrhea or constipation. No melena or hematochezia.  GENITOURINARY: No dysuria, frequency, hematuria, or incontinence  NEUROLOGICAL: No headaches, memory loss, loss of strength, numbness, or tremors  SKIN: No itching, burning, rashes, or lesions   LYMPH NODES: No enlarged glands  ENDOCRINE: No heat or cold intolerance; No hair loss  MUSCULOSKELETAL: No joint pain or swelling; No muscle, back, or extremity pain  PSYCHIATRIC: No depression, anxiety, mood swings, or difficulty sleeping  HEME/LYMPH: No easy bruising, or bleeding gums  ALLERY AND IMMUNOLOGIC: No hives or eczema     PAST MEDICAL & SURGICAL HISTORY:  Rheumatoid arthritis  PAD (peripheral artery disease)  H/O: hysterectomy  H/O Spinal surgery      MEDICATIONS: See outpatient home medication.     Allergies  No Known Allergies    SOCIAL HISTORY: Denies tobacco, alcohol, illicit drug use.            FAMILY HISTORY: No known family hx.     Vital Signs Last 24 Hrs  T(C): 36.6 (2024 20:09), Max: 36.6 (2024 20:09)  T(F): 97.8 (2024 20:09), Max: 97.8 (2024 20:09)  HR: 79 (2024 20:09) (79 - 83)  BP: 164/91 (2024 20:09) (134/75 - 164/91)  BP(mean): --  RR: 20 (2024 20:09) (18 - 20)  SpO2: 97% (2024 20:09) (96% - 97%)    Parameters below as of 2024 20:09  Patient On (Oxygen Delivery Method): room air    PHYSICAL EXAM:  CONSTITUTIONAL: NAD, well-developed  HEAD: Atraumatic, Normocephalic  EYES: Conjunctiva and sclera clear  ENMT: No tonsillar erythema, exudates, or enlargement; Moist mucous membranes, No lesions  NECK: Supple, No JVD, Normal thyroid  NERVOUS SYSTEM:  Alert & Oriented X3  RESPIRATORY: Clear to auscultation bilaterally; No rales, rhonchi, wheezing  CARDIOVASCULAR: Regular rate and rhythm. S1S2  GASTROINTESTINAL: Nondistended, +BS, soft, nontender, no guarding, no rigidity   MUSCULOSKELETAL: RLE superficial shin wound pink and clean. No signs of infection. No drainage currently. LLE WNL. 2+ DP and PT Pulses b/l. NV intact. Compartments soft, nontender, +ROM, +SILT b/l.     LABS:                        11.8   6.04  )-----------( 379      ( 2024 17:33 )             37.2     11-04    141  |  102  |  15  ----------------------------<  79  4.3   |  33[H]  |  0.54    Ca    9.4      2024 17:33    TPro  8.3  /  Alb  3.8  /  TBili  0.4  /  DBili  x   /  AST  28  /  ALT  21  /  AlkPhos  100  11-04    PT/INR - ( 2024 17:33 )   PT: 11.0 sec;   INR: 0.97 ratio      PTT - ( 2024 17:33 )  PTT:35.2 sec    Urinalysis Basic - ( 2024 20:03 )    Color: Yellow / Appearance: Clear / S.007 / pH: x  Gluc: x / Ketone: Negative mg/dL  / Bili: Negative / Urobili: 0.2 mg/dL   Blood: x / Protein: Negative mg/dL / Nitrite: Negative   Leuk Esterase: Negative / RBC: x / WBC x   Sq Epi: x / Non Sq Epi: x / Bacteria: x

## 2024-11-04 NOTE — ED PROVIDER NOTE - PATIENT PORTAL LINK FT
You can access the FollowMyHealth Patient Portal offered by [facility] by registering at the following website: [email or web address]. By joining [facility]’s FollowMyHealth portal, you will also be able to view your health information using other applications (apps) compatible with our system.

## 2024-11-04 NOTE — ED PROVIDER NOTE - CARE PLAN
1 Principal Discharge DX:	Superficial foreign body of right leg without major open wound and without infection

## 2024-11-04 NOTE — ED PROVIDER NOTE - NSFOLLOWUPINSTRUCTIONS_ED_ALL_ED_FT
You were evaluated by the wound care team in the ED and they do not think your wound is infected. Follow up with Dr. Figueroa in the wound clinic. Return to the ER for fever, pus drainage, severe pain, redness, or any other concerning symptoms.

## 2025-06-23 PROBLEM — I73.9 PERIPHERAL VASCULAR DISEASE, UNSPECIFIED: Chronic | Status: ACTIVE | Noted: 2024-11-04

## 2025-06-30 ENCOUNTER — NON-APPOINTMENT (OUTPATIENT)
Age: 89
End: 2025-06-30

## 2025-07-02 ENCOUNTER — OUTPATIENT (OUTPATIENT)
Dept: OUTPATIENT SERVICES | Facility: HOSPITAL | Age: 89
LOS: 1 days | End: 2025-07-02
Payer: MEDICARE

## 2025-07-02 ENCOUNTER — APPOINTMENT (OUTPATIENT)
Dept: WOUND CARE | Facility: CLINIC | Age: 89
End: 2025-07-02

## 2025-07-02 ENCOUNTER — APPOINTMENT (OUTPATIENT)
Dept: WOUND CARE | Facility: HOSPITAL | Age: 89
End: 2025-07-02

## 2025-07-02 DIAGNOSIS — Z98.890 OTHER SPECIFIED POSTPROCEDURAL STATES: Chronic | ICD-10-CM

## 2025-07-02 PROCEDURE — 99205 OFFICE O/P NEW HI 60 MIN: CPT | Mod: 25

## 2025-07-02 PROCEDURE — 11042 DBRDMT SUBQ TIS 1ST 20SQCM/<: CPT

## 2025-07-02 PROCEDURE — 29581 APPL MULTLAYER CMPRN SYS LEG: CPT | Mod: 50

## 2025-07-02 PROCEDURE — 93970 EXTREMITY STUDY: CPT | Mod: 26

## 2025-07-02 PROCEDURE — G0463: CPT

## 2025-07-02 PROCEDURE — 93970 EXTREMITY STUDY: CPT

## 2025-07-02 RX ORDER — ASPIRIN 81 MG
81 TABLET, DELAYED RELEASE (ENTERIC COATED) ORAL
Refills: 0 | Status: ACTIVE | COMMUNITY

## 2025-07-02 RX ORDER — FUROSEMIDE 40 MG/1
40 TABLET ORAL
Refills: 0 | Status: ACTIVE | COMMUNITY

## 2025-07-03 ENCOUNTER — INPATIENT (INPATIENT)
Facility: HOSPITAL | Age: 89
LOS: 6 days | Discharge: NURSING, REHAB OR LONG-TERM CARE | End: 2025-07-10
Attending: STUDENT IN AN ORGANIZED HEALTH CARE EDUCATION/TRAINING PROGRAM | Admitting: STUDENT IN AN ORGANIZED HEALTH CARE EDUCATION/TRAINING PROGRAM
Payer: MEDICARE

## 2025-07-03 VITALS
RESPIRATION RATE: 20 BRPM | TEMPERATURE: 98 F | OXYGEN SATURATION: 99 % | HEART RATE: 83 BPM | SYSTOLIC BLOOD PRESSURE: 152 MMHG | DIASTOLIC BLOOD PRESSURE: 88 MMHG | WEIGHT: 175.05 LBS | HEIGHT: 61 IN

## 2025-07-03 DIAGNOSIS — Z98.890 OTHER SPECIFIED POSTPROCEDURAL STATES: Chronic | ICD-10-CM

## 2025-07-03 LAB
ALBUMIN SERPL ELPH-MCNC: 3.5 G/DL — SIGNIFICANT CHANGE UP (ref 3.3–5)
ALP SERPL-CCNC: 112 U/L — SIGNIFICANT CHANGE UP (ref 40–120)
ALT FLD-CCNC: 26 U/L — SIGNIFICANT CHANGE UP (ref 12–78)
ANION GAP SERPL CALC-SCNC: 5 MMOL/L — SIGNIFICANT CHANGE UP (ref 5–17)
AST SERPL-CCNC: 21 U/L — SIGNIFICANT CHANGE UP (ref 15–37)
BASOPHILS # BLD AUTO: 0.02 K/UL — SIGNIFICANT CHANGE UP (ref 0–0.2)
BASOPHILS NFR BLD AUTO: 0.4 % — SIGNIFICANT CHANGE UP (ref 0–2)
BILIRUB SERPL-MCNC: 0.4 MG/DL — SIGNIFICANT CHANGE UP (ref 0.2–1.2)
BUN SERPL-MCNC: 21 MG/DL — SIGNIFICANT CHANGE UP (ref 7–23)
CALCIUM SERPL-MCNC: 9 MG/DL — SIGNIFICANT CHANGE UP (ref 8.5–10.1)
CHLORIDE SERPL-SCNC: 107 MMOL/L — SIGNIFICANT CHANGE UP (ref 96–108)
CO2 SERPL-SCNC: 31 MMOL/L — SIGNIFICANT CHANGE UP (ref 22–31)
CREAT SERPL-MCNC: 0.63 MG/DL — SIGNIFICANT CHANGE UP (ref 0.5–1.3)
EGFR: 83 ML/MIN/1.73M2 — SIGNIFICANT CHANGE UP
EGFR: 83 ML/MIN/1.73M2 — SIGNIFICANT CHANGE UP
EOSINOPHIL # BLD AUTO: 0.08 K/UL — SIGNIFICANT CHANGE UP (ref 0–0.5)
EOSINOPHIL NFR BLD AUTO: 1.5 % — SIGNIFICANT CHANGE UP (ref 0–6)
GLUCOSE SERPL-MCNC: 106 MG/DL — HIGH (ref 70–99)
HCT VFR BLD CALC: 33.7 % — LOW (ref 34.5–45)
HGB BLD-MCNC: 10.6 G/DL — LOW (ref 11.5–15.5)
IMM GRANULOCYTES NFR BLD AUTO: 0.2 % — SIGNIFICANT CHANGE UP (ref 0–0.9)
LYMPHOCYTES # BLD AUTO: 1.33 K/UL — SIGNIFICANT CHANGE UP (ref 1–3.3)
LYMPHOCYTES # BLD AUTO: 25 % — SIGNIFICANT CHANGE UP (ref 13–44)
MCHC RBC-ENTMCNC: 24.3 PG — LOW (ref 27–34)
MCHC RBC-ENTMCNC: 31.5 G/DL — LOW (ref 32–36)
MCV RBC AUTO: 77.1 FL — LOW (ref 80–100)
MONOCYTES # BLD AUTO: 0.9 K/UL — SIGNIFICANT CHANGE UP (ref 0–0.9)
MONOCYTES NFR BLD AUTO: 16.9 % — HIGH (ref 2–14)
NEUTROPHILS # BLD AUTO: 2.97 K/UL — SIGNIFICANT CHANGE UP (ref 1.8–7.4)
NEUTROPHILS NFR BLD AUTO: 56 % — SIGNIFICANT CHANGE UP (ref 43–77)
NRBC BLD AUTO-RTO: 0 /100 WBCS — SIGNIFICANT CHANGE UP (ref 0–0)
NT-PROBNP SERPL-SCNC: 254 PG/ML — SIGNIFICANT CHANGE UP (ref 0–450)
PLATELET # BLD AUTO: 324 K/UL — SIGNIFICANT CHANGE UP (ref 150–400)
POTASSIUM SERPL-MCNC: 5 MMOL/L — SIGNIFICANT CHANGE UP (ref 3.5–5.3)
POTASSIUM SERPL-SCNC: 5 MMOL/L — SIGNIFICANT CHANGE UP (ref 3.5–5.3)
PROT SERPL-MCNC: 8 GM/DL — SIGNIFICANT CHANGE UP (ref 6–8.3)
RBC # BLD: 4.37 M/UL — SIGNIFICANT CHANGE UP (ref 3.8–5.2)
RBC # FLD: 15.3 % — HIGH (ref 10.3–14.5)
SODIUM SERPL-SCNC: 143 MMOL/L — SIGNIFICANT CHANGE UP (ref 135–145)
TROPONIN I, HIGH SENSITIVITY RESULT: 10.4 NG/L — SIGNIFICANT CHANGE UP
WBC # BLD: 5.31 K/UL — SIGNIFICANT CHANGE UP (ref 3.8–10.5)
WBC # FLD AUTO: 5.31 K/UL — SIGNIFICANT CHANGE UP (ref 3.8–10.5)

## 2025-07-03 PROCEDURE — 71045 X-RAY EXAM CHEST 1 VIEW: CPT | Mod: 26

## 2025-07-03 PROCEDURE — 99285 EMERGENCY DEPT VISIT HI MDM: CPT

## 2025-07-03 PROCEDURE — 93010 ELECTROCARDIOGRAM REPORT: CPT

## 2025-07-03 NOTE — ED ADULT TRIAGE NOTE - CHIEF COMPLAINT QUOTE
Fell trying to lift leg into house, started wound care yesterday, bilateral pedal edema, unable to get around No LOC

## 2025-07-03 NOTE — ED ADULT TRIAGE NOTE - SPO2 (%)
"  Chief Complaint   Patient presents with    Ingrown Toenail     Right Great Toenail         HPI:   33 y.o. male returns for follow up s/p ingrown toenail procedure - right hallux.  Date of procedure 12/22/2023.  Concerned about recurrence.  Redness and pus noted a few weeks ago. He has been applying neosporin with improvement.   He wears steel toe boots for work.         There is no problem list on file for this patient.      Current Outpatient Medications on File Prior to Visit   Medication Sig Dispense Refill    COVID-19 AT-HOME TEST Kit FOLLOW INSTRUCTIONS INCLUDED WITH THE PACKAGE.       No current facility-administered medications on file prior to visit.         Review of patient's allergies indicates:  No Known Allergies        O:   Vitals:    05/04/23 1156   BP: 123/68   Pulse: 61   Weight: 65.8 kg (145 lb)   Height: 5' 7" (1.702 m)        S/p  right  hallux ingrown toenail matrixectomy. Minimal  erythema;  no ascending cellulitis.   Small nail spicule noted to proximal border.   No drainage.    Pedal pulses are palpable.   Range of motion intact.             A/P:     1. Toe pain, right        2. Follow-up exam after treatment        3. Ingrown nail               S/p right hallux ingrown toenail chemical matrixectomy.   Nail spicule trimmed.    Okay to continue topical neosporin x one week.  Modify shoes as needed to reduce compression on toes.   Call or return to clinic prn if these symptoms worsen or fail to improve as anticipated.     " 99

## 2025-07-03 NOTE — ED ADULT NURSE NOTE - OBJECTIVE STATEMENT
Patient was being transferred from Outside into house. Patient was attempting to climb up 1 step with her daughter behind her supporting her when "my knees gave up." Daughter caught her and lowered her to Floor. Denies Head strike. Denies Pain. Patient with special wound and compression dressing to both lower legs. (Sees Wound care Specialist at [facility]."

## 2025-07-04 DIAGNOSIS — I10 ESSENTIAL (PRIMARY) HYPERTENSION: ICD-10-CM

## 2025-07-04 DIAGNOSIS — E11.9 TYPE 2 DIABETES MELLITUS WITHOUT COMPLICATIONS: ICD-10-CM

## 2025-07-04 DIAGNOSIS — I50.32 CHRONIC DIASTOLIC (CONGESTIVE) HEART FAILURE: ICD-10-CM

## 2025-07-04 DIAGNOSIS — D64.9 ANEMIA, UNSPECIFIED: ICD-10-CM
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
## 2025-07-04 LAB
ALBUMIN SERPL ELPH-MCNC: 3.1 G/DL — LOW (ref 3.3–5)
ALP SERPL-CCNC: 114 U/L — SIGNIFICANT CHANGE UP (ref 40–120)
ALT FLD-CCNC: 24 U/L — SIGNIFICANT CHANGE UP (ref 12–78)
ANION GAP SERPL CALC-SCNC: 7 MMOL/L — SIGNIFICANT CHANGE UP (ref 5–17)
AST SERPL-CCNC: 23 U/L — SIGNIFICANT CHANGE UP (ref 15–37)
BASOPHILS # BLD AUTO: 0.02 K/UL — SIGNIFICANT CHANGE UP (ref 0–0.2)
BASOPHILS NFR BLD AUTO: 0.4 % — SIGNIFICANT CHANGE UP (ref 0–2)
BILIRUB SERPL-MCNC: 0.6 MG/DL — SIGNIFICANT CHANGE UP (ref 0.2–1.2)
BUN SERPL-MCNC: 17 MG/DL — SIGNIFICANT CHANGE UP (ref 7–23)
CALCIUM SERPL-MCNC: 8.7 MG/DL — SIGNIFICANT CHANGE UP (ref 8.5–10.1)
CHLORIDE SERPL-SCNC: 102 MMOL/L — SIGNIFICANT CHANGE UP (ref 96–108)
CO2 SERPL-SCNC: 30 MMOL/L — SIGNIFICANT CHANGE UP (ref 22–31)
CREAT SERPL-MCNC: 0.8 MG/DL — SIGNIFICANT CHANGE UP (ref 0.5–1.3)
EGFR: 69 ML/MIN/1.73M2 — SIGNIFICANT CHANGE UP
EGFR: 69 ML/MIN/1.73M2 — SIGNIFICANT CHANGE UP
EOSINOPHIL # BLD AUTO: 0.04 K/UL — SIGNIFICANT CHANGE UP (ref 0–0.5)
EOSINOPHIL NFR BLD AUTO: 0.8 % — SIGNIFICANT CHANGE UP (ref 0–6)
FERRITIN SERPL-MCNC: 79 NG/ML — SIGNIFICANT CHANGE UP (ref 13–330)
GLUCOSE BLDC GLUCOMTR-MCNC: 134 MG/DL — HIGH (ref 70–99)
GLUCOSE BLDC GLUCOMTR-MCNC: 139 MG/DL — HIGH (ref 70–99)
GLUCOSE BLDC GLUCOMTR-MCNC: 280 MG/DL — HIGH (ref 70–99)
GLUCOSE BLDC GLUCOMTR-MCNC: 84 MG/DL — SIGNIFICANT CHANGE UP (ref 70–99)
GLUCOSE SERPL-MCNC: 233 MG/DL — HIGH (ref 70–99)
HCT VFR BLD CALC: 35.9 % — SIGNIFICANT CHANGE UP (ref 34.5–45)
HGB BLD-MCNC: 11.2 G/DL — LOW (ref 11.5–15.5)
IMM GRANULOCYTES NFR BLD AUTO: 0.4 % — SIGNIFICANT CHANGE UP (ref 0–0.9)
IRON SATN MFR SERPL: 21 % — SIGNIFICANT CHANGE UP (ref 14–50)
IRON SATN MFR SERPL: 69 UG/DL — SIGNIFICANT CHANGE UP (ref 30–160)
LYMPHOCYTES # BLD AUTO: 1.09 K/UL — SIGNIFICANT CHANGE UP (ref 1–3.3)
LYMPHOCYTES # BLD AUTO: 21.7 % — SIGNIFICANT CHANGE UP (ref 13–44)
MCHC RBC-ENTMCNC: 24.2 PG — LOW (ref 27–34)
MCHC RBC-ENTMCNC: 31.2 G/DL — LOW (ref 32–36)
MCV RBC AUTO: 77.5 FL — LOW (ref 80–100)
MONOCYTES # BLD AUTO: 0.67 K/UL — SIGNIFICANT CHANGE UP (ref 0–0.9)
MONOCYTES NFR BLD AUTO: 13.3 % — SIGNIFICANT CHANGE UP (ref 2–14)
NEUTROPHILS # BLD AUTO: 3.19 K/UL — SIGNIFICANT CHANGE UP (ref 1.8–7.4)
NEUTROPHILS NFR BLD AUTO: 63.4 % — SIGNIFICANT CHANGE UP (ref 43–77)
NRBC BLD AUTO-RTO: 0 /100 WBCS — SIGNIFICANT CHANGE UP (ref 0–0)
PLATELET # BLD AUTO: 347 K/UL — SIGNIFICANT CHANGE UP (ref 150–400)
POTASSIUM SERPL-MCNC: 3.5 MMOL/L — SIGNIFICANT CHANGE UP (ref 3.5–5.3)
POTASSIUM SERPL-SCNC: 3.5 MMOL/L — SIGNIFICANT CHANGE UP (ref 3.5–5.3)
PROT SERPL-MCNC: 8.2 GM/DL — SIGNIFICANT CHANGE UP (ref 6–8.3)
RBC # BLD: 4.63 M/UL — SIGNIFICANT CHANGE UP (ref 3.8–5.2)
RBC # FLD: 15.2 % — HIGH (ref 10.3–14.5)
SODIUM SERPL-SCNC: 139 MMOL/L — SIGNIFICANT CHANGE UP (ref 135–145)
TIBC SERPL-MCNC: 330 UG/DL — SIGNIFICANT CHANGE UP (ref 220–430)
TRANSFERRIN SERPL-MCNC: 250 MG/DL — SIGNIFICANT CHANGE UP (ref 200–360)
UIBC SERPL-MCNC: 261 UG/DL — SIGNIFICANT CHANGE UP (ref 110–370)
WBC # BLD: 5.03 K/UL — SIGNIFICANT CHANGE UP (ref 3.8–10.5)
WBC # FLD AUTO: 5.03 K/UL — SIGNIFICANT CHANGE UP (ref 3.8–10.5)

## 2025-07-04 PROCEDURE — 99222 1ST HOSP IP/OBS MODERATE 55: CPT

## 2025-07-04 RX ORDER — DEXTROSE 50 % IN WATER 50 %
25 SYRINGE (ML) INTRAVENOUS ONCE
Refills: 0 | Status: DISCONTINUED | OUTPATIENT
Start: 2025-07-04 | End: 2025-07-10

## 2025-07-04 RX ORDER — FUROSEMIDE 10 MG/ML
40 INJECTION INTRAMUSCULAR; INTRAVENOUS DAILY
Refills: 0 | Status: DISCONTINUED | OUTPATIENT
Start: 2025-07-04 | End: 2025-07-10

## 2025-07-04 RX ORDER — ACETAMINOPHEN 500 MG/5ML
650 LIQUID (ML) ORAL EVERY 6 HOURS
Refills: 0 | Status: DISCONTINUED | OUTPATIENT
Start: 2025-07-04 | End: 2025-07-10

## 2025-07-04 RX ORDER — SODIUM CHLORIDE 9 G/1000ML
1000 INJECTION, SOLUTION INTRAVENOUS
Refills: 0 | Status: DISCONTINUED | OUTPATIENT
Start: 2025-07-04 | End: 2025-07-10

## 2025-07-04 RX ORDER — ENOXAPARIN SODIUM 100 MG/ML
40 INJECTION SUBCUTANEOUS EVERY 24 HOURS
Refills: 0 | Status: DISCONTINUED | OUTPATIENT
Start: 2025-07-04 | End: 2025-07-10

## 2025-07-04 RX ORDER — DEXTROSE 50 % IN WATER 50 %
12.5 SYRINGE (ML) INTRAVENOUS ONCE
Refills: 0 | Status: DISCONTINUED | OUTPATIENT
Start: 2025-07-04 | End: 2025-07-10

## 2025-07-04 RX ORDER — INSULIN LISPRO 100 U/ML
INJECTION, SOLUTION INTRAVENOUS; SUBCUTANEOUS
Refills: 0 | Status: DISCONTINUED | OUTPATIENT
Start: 2025-07-04 | End: 2025-07-10

## 2025-07-04 RX ORDER — DEXTROSE 50 % IN WATER 50 %
15 SYRINGE (ML) INTRAVENOUS ONCE
Refills: 0 | Status: DISCONTINUED | OUTPATIENT
Start: 2025-07-04 | End: 2025-07-10

## 2025-07-04 RX ORDER — GLUCAGON 3 MG/1
1 POWDER NASAL ONCE
Refills: 0 | Status: DISCONTINUED | OUTPATIENT
Start: 2025-07-04 | End: 2025-07-10

## 2025-07-04 RX ADMIN — INSULIN LISPRO 3: 100 INJECTION, SOLUTION INTRAVENOUS; SUBCUTANEOUS at 11:41

## 2025-07-04 RX ADMIN — Medication 1000 UNIT(S): at 11:41

## 2025-07-04 RX ADMIN — FUROSEMIDE 40 MILLIGRAM(S): 10 INJECTION INTRAMUSCULAR; INTRAVENOUS at 06:24

## 2025-07-04 RX ADMIN — ENOXAPARIN SODIUM 40 MILLIGRAM(S): 100 INJECTION SUBCUTANEOUS at 06:24

## 2025-07-04 NOTE — H&P ADULT - NSHPLABSRESULTS_GEN_ALL_CORE
LABS:  cret                        10.6   5.31  )-----------( 324      ( 03 Jul 2025 20:00 )             33.7     07-03    143  |  107  |  21  ----------------------------<  106[H]  5.0   |  31  |  0.63    Ca    9.0      03 Jul 2025 20:00    TPro  8.0  /  Alb  3.5  /  TBili  0.4  /  DBili  x   /  AST  21  /  ALT  26  /  AlkPhos  112  07-03

## 2025-07-04 NOTE — H&P ADULT - PROBLEM SELECTOR PLAN 2
- mild   - No signs of overt bleeding  - f/u iron panel, CBC  - transfuse for hgb less than 7  - colonoscopy outpatient as appropriate

## 2025-07-04 NOTE — H&P ADULT - NSHPPHYSICALEXAM_GEN_ALL_CORE
GENERAL: NAD  HEAD:  Atraumatic, normocephalic  EYES: EOMI, PERRL  NECK: Supple, trachea midline  HEART: Regular rate and rhythm  LUNGS: Unlabored respirations. Clear to auscultation bilaterally, no crackles, wheezing, or rhonchi  ABDOMEN: Soft, nontender, nondistended, +BS  EXTREMITIES: 2+ peripheral pulses bilaterally. No clubbing, cyanosis, or edema  NERVOUS SYSTEM: moving all extremities, no focal deficits

## 2025-07-04 NOTE — CHART NOTE - NSCHARTNOTEFT_GEN_A_CORE
89-year-old female with past medical history of chronic lower extremity venous insufficiency, chronic diastolic CHF, type 2 diabetes, rheumatoid arthritis, who presented to the ED after a fall while trying to get in the house. Daughter reports that lower extremity swelling has been worsening over the last few days. Admit to medicine for further management.   Pt was seen and examined, no acute events.       Problem/Plan - 1:  ·  Problem: Fall at home.   ·  Plan: - fell at home  - worsening lower extremity edema   - questionable compliance to medications  - Lasix 40 mg iv qd  - PT eval when able to tolerate.     Problem/Plan - 2:  ·  Problem: Anemia.   ·  Plan: - mild   - No signs of overt bleeding  - f/u iron panel, CBC  - transfuse for hgb less than 7  - colonoscopy outpatient as appropriate.     Problem/Plan - 3:  ·  Problem: Diastolic CHF, chronic.   ·  Plan: resume lasix.     Problem/Plan - 4:  ·  Problem: HTN (hypertension).   ·  Plan: c/w lasix  continue to monitor and adjust as needed.     Problem/Plan - 5:  ·  Problem: DM2 (diabetes mellitus, type 2).   ·  Plan: - Home meds: diet controlled  - start  ISS, POC BG achs>> calculate basal bolus based on need  - hypoglycemia protocol in place  - carb control diet  - f/u A1c.     Problem/Plan - 6:  ·  Problem: Prophylactic measure.   ·  Plan: - DVT ppx: lovenox SQ  - GI ppx: not required  - Reassess need daily.

## 2025-07-04 NOTE — PATIENT PROFILE ADULT - FALL HARM RISK - HARM RISK INTERVENTIONS

## 2025-07-04 NOTE — PATIENT PROFILE ADULT - FUNCTIONAL ASSESSMENT - BASIC MOBILITY SCORE.
14

## 2025-07-04 NOTE — ED PROVIDER NOTE - PHYSICAL EXAMINATION
General appearance: Nontoxic appearing, conversant, afebrile    Eyes: anicteric sclerae, BEVERLY, EOMI   HENT: Atraumatic; oropharynx clear, MMM and no ulcerations, no pharyngeal erythema or exudate   Neck: Trachea midline; Full range of motion, supple   Pulm: CTA bl, normal respiratory effort and no intercostal retractions, normal work of breathing   CV: RRR, No murmurs, rubs, or gallops   Extremities: @+ b/l LE peripheral edema, no gross deformities, FROM x4, 5/5 MS x4, gross sensation intact    Skin: Dry, normal temperature, turgor and texture; no rash   Psych: Appropriate affect, cooperative

## 2025-07-04 NOTE — H&P ADULT - HISTORY OF PRESENT ILLNESS
89-year-old female with past medical history of chronic lower extremity venous insufficiency, chronic diastolic CHF, type 2 diabetes, rheumatoid arthritis, who presented to the ED after a fall while trying to get in the house. Daughter reports that lower extremity swelling has been worsening over the last few days. The patient denies any head strike, loss of consciousness, pain/difficulty walking after fall, tingling, numbness of lower extremities. She denies any preceding symptoms such as chest pain, shortness of breath, heart palpitations, or other complaints.   On presentation, the patient was mildly hypertensive otherwise stable vital signs. Labs notable for mild anemia otherwise unremarkable. No medications in the ED.

## 2025-07-04 NOTE — H&P ADULT - PROBLEM SELECTOR PLAN 1
- fell at home  - worsening lower extremity edema   - questionable compliance to medications  - Lasix 40 mg iv qd  - PT eval when able to tolerate

## 2025-07-04 NOTE — PATIENT PROFILE ADULT - NSPROMEDSADMININFO_GEN_A_NUR
71 yo with ho of sarcoidosis, not currently on medications and has not seen a pulmonologist since the 90's, last saw pcp one month ago although pt is poor historian and unable to tell me what occurred at that visit, reports he thinks his sarcoid is back, pt reports many months now of daily SOB and productive cough, + non smoker, no fevers, SOB worse w exertion, + hemoptysis, reports the prednisone he as bought on the market in [place] has not worked so he thinks its not the same as the prednisone he took in the 90's, does not want HIV testing, denies ho of TB, denies sexual activity or drug use.
no concerns

## 2025-07-04 NOTE — H&P ADULT - ASSESSMENT
89-year-old female with past medical history of chronic lower extremity venous insufficiency, chronic diastolic CHF, type 2 diabetes, rheumatoid arthritis, who presented to the ED after a fall while trying to get in the house. Daughter reports that lower extremity swelling has been worsening over the last few days. Admit to medicine for further management.

## 2025-07-04 NOTE — H&P ADULT - PROBLEM SELECTOR PLAN 5
- Home meds: diet controlled  - start  ISS, POC BG achs>> calculate basal bolus based on need  - hypoglycemia protocol in place  - carb control diet  - f/u A1c

## 2025-07-04 NOTE — PATIENT PROFILE ADULT - FUNCTIONAL ASSESSMENT - BASIC MOBILITY 5.
86 yo F hx colon CA, dementia, bedbound, necrotizing decubitus ulcer with extension to gluteal area and some epidural air at level of sacrum as well with growth of strep species and bacteroides from the blood. The strep has been identified as a strep anginosis.  She also has PE  and DVT.  Now s/p excisional debridement of sacral decub POD#4  she is s/p application of micromatrix on 12/28  Bone culture with mixed anaerobes as well as a sensitive E coli  TTE without vegetations  Recommendations:  1 continue zosyn for now, day 8  2. Off loading, and local wound care will be critical  3. Will consider extending antibiotic course after zosyn with oral augmentin, if concern of residual osteo  4. Repeat blood cultures if she spikes a fever.  5. When wound vac taken down if wound appears acceptable will consider transition to oral augmentin.   2 = A lot of assistance

## 2025-07-04 NOTE — H&P ADULT - NSICDXPASTMEDICALHX_GEN_ALL_CORE_FT
PAST MEDICAL HISTORY:  Chronic diastolic congestive heart failure     Chronic venous insufficiency     PAD (peripheral artery disease)     Rheumatoid arthritis

## 2025-07-04 NOTE — ED PROVIDER NOTE - CLINICAL SUMMARY MEDICAL DECISION MAKING FREE TEXT BOX
89yo female with pmh pad, peripheral edema, presenting with difficulty ambulating and leg swelling.  Lately she has had increase in swelling in the legs limiting her ability to ambulate.  Over the past 4 days she has been unable to get out of bed and as a result she has not been taking her lasix to avoid urinating.  No sob, cp, abd pain, n/v, fevers.  Today she was trying to walk with daughter and tripped.  Did not hit head.  Denies any pain or injuries from the fall.  No ac use.  Will need labs.  Patient agreeable to admission for PT and BRIA.

## 2025-07-05 LAB
A1C WITH ESTIMATED AVERAGE GLUCOSE RESULT: 6.5 % — HIGH (ref 4–5.6)
ESTIMATED AVERAGE GLUCOSE: 140 MG/DL — HIGH (ref 68–114)
GLUCOSE BLDC GLUCOMTR-MCNC: 110 MG/DL — HIGH (ref 70–99)
GLUCOSE BLDC GLUCOMTR-MCNC: 114 MG/DL — HIGH (ref 70–99)
GLUCOSE BLDC GLUCOMTR-MCNC: 139 MG/DL — HIGH (ref 70–99)
GLUCOSE BLDC GLUCOMTR-MCNC: 149 MG/DL — HIGH (ref 70–99)

## 2025-07-05 PROCEDURE — 99232 SBSQ HOSP IP/OBS MODERATE 35: CPT

## 2025-07-05 RX ADMIN — FUROSEMIDE 40 MILLIGRAM(S): 10 INJECTION INTRAMUSCULAR; INTRAVENOUS at 05:32

## 2025-07-05 RX ADMIN — Medication 1000 UNIT(S): at 11:31

## 2025-07-05 RX ADMIN — ENOXAPARIN SODIUM 40 MILLIGRAM(S): 100 INJECTION SUBCUTANEOUS at 05:32

## 2025-07-06 LAB
ANION GAP SERPL CALC-SCNC: 4 MMOL/L — LOW (ref 5–17)
BUN SERPL-MCNC: 23 MG/DL — SIGNIFICANT CHANGE UP (ref 7–23)
CALCIUM SERPL-MCNC: 8.5 MG/DL — SIGNIFICANT CHANGE UP (ref 8.5–10.1)
CHLORIDE SERPL-SCNC: 103 MMOL/L — SIGNIFICANT CHANGE UP (ref 96–108)
CO2 SERPL-SCNC: 29 MMOL/L — SIGNIFICANT CHANGE UP (ref 22–31)
CREAT SERPL-MCNC: 0.57 MG/DL — SIGNIFICANT CHANGE UP (ref 0.5–1.3)
CULTURE, WOUND AEROBE ANAEROBE: ABNORMAL
EGFR: 85 ML/MIN/1.73M2 — SIGNIFICANT CHANGE UP
EGFR: 85 ML/MIN/1.73M2 — SIGNIFICANT CHANGE UP
GLUCOSE BLDC GLUCOMTR-MCNC: 119 MG/DL — HIGH (ref 70–99)
GLUCOSE BLDC GLUCOMTR-MCNC: 143 MG/DL — HIGH (ref 70–99)
GLUCOSE BLDC GLUCOMTR-MCNC: 186 MG/DL — HIGH (ref 70–99)
GLUCOSE BLDC GLUCOMTR-MCNC: 92 MG/DL — SIGNIFICANT CHANGE UP (ref 70–99)
GLUCOSE SERPL-MCNC: 125 MG/DL — HIGH (ref 70–99)
POTASSIUM SERPL-MCNC: 4 MMOL/L — SIGNIFICANT CHANGE UP (ref 3.5–5.3)
POTASSIUM SERPL-SCNC: 4 MMOL/L — SIGNIFICANT CHANGE UP (ref 3.5–5.3)
SODIUM SERPL-SCNC: 136 MMOL/L — SIGNIFICANT CHANGE UP (ref 135–145)

## 2025-07-06 PROCEDURE — 99232 SBSQ HOSP IP/OBS MODERATE 35: CPT

## 2025-07-06 RX ADMIN — Medication 650 MILLIGRAM(S): at 06:18

## 2025-07-06 RX ADMIN — Medication 650 MILLIGRAM(S): at 15:22

## 2025-07-06 RX ADMIN — FUROSEMIDE 40 MILLIGRAM(S): 10 INJECTION INTRAMUSCULAR; INTRAVENOUS at 05:36

## 2025-07-06 RX ADMIN — Medication 650 MILLIGRAM(S): at 05:38

## 2025-07-06 RX ADMIN — ENOXAPARIN SODIUM 40 MILLIGRAM(S): 100 INJECTION SUBCUTANEOUS at 05:37

## 2025-07-06 RX ADMIN — Medication 1000 UNIT(S): at 12:09

## 2025-07-06 RX ADMIN — Medication 650 MILLIGRAM(S): at 14:22

## 2025-07-06 NOTE — PHYSICAL THERAPY INITIAL EVALUATION ADULT - GAIT TRAINING, PT EVAL
Patient will ambulate 25 feet with rolling walker with minimal assistance x 1 for household ambulation in 3-4 weeks.

## 2025-07-06 NOTE — PHYSICAL THERAPY INITIAL EVALUATION ADULT - PERTINENT HX OF CURRENT PROBLEM, REHAB EVAL
Patient admitted with fall at home while attempting to get into house. Patient found to have peripheral edema. Patient's daughter Sendy at bedside reports patient had just began to go to outpatient wound care with Dr. Colby.

## 2025-07-06 NOTE — PHYSICAL THERAPY INITIAL EVALUATION ADULT - ADDITIONAL COMMENTS
Patient lives with daughter in a private house with +ramp entrance and then 1 step in (where patient fell when attempting to get in prompting her visit to ER). Patient has been getting progressively weaker over the past few months, requiring some assistance for transfers and gait. Patient is a household ambulator.

## 2025-07-06 NOTE — PHYSICAL THERAPY INITIAL EVALUATION ADULT - IMPAIRMENTS CONTRIBUTING TO GAIT DEVIATIONS, PT EVAL
impaired balance/decreased strength

## 2025-07-07 LAB
GLUCOSE BLDC GLUCOMTR-MCNC: 104 MG/DL — HIGH (ref 70–99)
GLUCOSE BLDC GLUCOMTR-MCNC: 114 MG/DL — HIGH (ref 70–99)
GLUCOSE BLDC GLUCOMTR-MCNC: 130 MG/DL — HIGH (ref 70–99)
GLUCOSE BLDC GLUCOMTR-MCNC: 162 MG/DL — HIGH (ref 70–99)

## 2025-07-07 PROCEDURE — 99232 SBSQ HOSP IP/OBS MODERATE 35: CPT

## 2025-07-07 RX ORDER — BISACODYL 5 MG
5 TABLET, DELAYED RELEASE (ENTERIC COATED) ORAL ONCE
Refills: 0 | Status: COMPLETED | OUTPATIENT
Start: 2025-07-07 | End: 2025-07-07

## 2025-07-07 RX ADMIN — INSULIN LISPRO 1: 100 INJECTION, SOLUTION INTRAVENOUS; SUBCUTANEOUS at 12:00

## 2025-07-07 RX ADMIN — Medication 650 MILLIGRAM(S): at 12:01

## 2025-07-07 RX ADMIN — Medication 1000 UNIT(S): at 12:00

## 2025-07-07 RX ADMIN — Medication 5 MILLIGRAM(S): at 18:24

## 2025-07-07 RX ADMIN — Medication 650 MILLIGRAM(S): at 13:01

## 2025-07-07 RX ADMIN — FUROSEMIDE 40 MILLIGRAM(S): 10 INJECTION INTRAMUSCULAR; INTRAVENOUS at 05:52

## 2025-07-07 RX ADMIN — ENOXAPARIN SODIUM 40 MILLIGRAM(S): 100 INJECTION SUBCUTANEOUS at 05:52

## 2025-07-08 LAB
ANION GAP SERPL CALC-SCNC: 6 MMOL/L — SIGNIFICANT CHANGE UP (ref 5–17)
BUN SERPL-MCNC: 26 MG/DL — HIGH (ref 7–23)
CALCIUM SERPL-MCNC: 9 MG/DL — SIGNIFICANT CHANGE UP (ref 8.5–10.1)
CHLORIDE SERPL-SCNC: 100 MMOL/L — SIGNIFICANT CHANGE UP (ref 96–108)
CO2 SERPL-SCNC: 31 MMOL/L — SIGNIFICANT CHANGE UP (ref 22–31)
CREAT SERPL-MCNC: 0.59 MG/DL — SIGNIFICANT CHANGE UP (ref 0.5–1.3)
EGFR: 84 ML/MIN/1.73M2 — SIGNIFICANT CHANGE UP
EGFR: 84 ML/MIN/1.73M2 — SIGNIFICANT CHANGE UP
GLUCOSE BLDC GLUCOMTR-MCNC: 108 MG/DL — HIGH (ref 70–99)
GLUCOSE BLDC GLUCOMTR-MCNC: 113 MG/DL — HIGH (ref 70–99)
GLUCOSE BLDC GLUCOMTR-MCNC: 122 MG/DL — HIGH (ref 70–99)
GLUCOSE BLDC GLUCOMTR-MCNC: 147 MG/DL — HIGH (ref 70–99)
GLUCOSE SERPL-MCNC: 110 MG/DL — HIGH (ref 70–99)
POTASSIUM SERPL-MCNC: 3.7 MMOL/L — SIGNIFICANT CHANGE UP (ref 3.5–5.3)
POTASSIUM SERPL-SCNC: 3.7 MMOL/L — SIGNIFICANT CHANGE UP (ref 3.5–5.3)
SODIUM SERPL-SCNC: 137 MMOL/L — SIGNIFICANT CHANGE UP (ref 135–145)

## 2025-07-08 PROCEDURE — 99232 SBSQ HOSP IP/OBS MODERATE 35: CPT

## 2025-07-08 RX ADMIN — Medication 650 MILLIGRAM(S): at 21:18

## 2025-07-08 RX ADMIN — FUROSEMIDE 40 MILLIGRAM(S): 10 INJECTION INTRAMUSCULAR; INTRAVENOUS at 05:40

## 2025-07-08 RX ADMIN — Medication 1000 UNIT(S): at 12:12

## 2025-07-08 RX ADMIN — ENOXAPARIN SODIUM 40 MILLIGRAM(S): 100 INJECTION SUBCUTANEOUS at 05:40

## 2025-07-08 RX ADMIN — Medication 650 MILLIGRAM(S): at 22:18

## 2025-07-09 ENCOUNTER — TRANSCRIPTION ENCOUNTER (OUTPATIENT)
Age: 89
End: 2025-07-09

## 2025-07-09 LAB
ANION GAP SERPL CALC-SCNC: 8 MMOL/L — SIGNIFICANT CHANGE UP (ref 5–17)
BUN SERPL-MCNC: 28 MG/DL — HIGH (ref 7–23)
CALCIUM SERPL-MCNC: 9.2 MG/DL — SIGNIFICANT CHANGE UP (ref 8.5–10.1)
CHLORIDE SERPL-SCNC: 98 MMOL/L — SIGNIFICANT CHANGE UP (ref 96–108)
CO2 SERPL-SCNC: 31 MMOL/L — SIGNIFICANT CHANGE UP (ref 22–31)
CREAT SERPL-MCNC: 0.66 MG/DL — SIGNIFICANT CHANGE UP (ref 0.5–1.3)
EGFR: 82 ML/MIN/1.73M2 — SIGNIFICANT CHANGE UP
EGFR: 82 ML/MIN/1.73M2 — SIGNIFICANT CHANGE UP
GLUCOSE BLDC GLUCOMTR-MCNC: 102 MG/DL — HIGH (ref 70–99)
GLUCOSE BLDC GLUCOMTR-MCNC: 134 MG/DL — HIGH (ref 70–99)
GLUCOSE BLDC GLUCOMTR-MCNC: 136 MG/DL — HIGH (ref 70–99)
GLUCOSE BLDC GLUCOMTR-MCNC: 173 MG/DL — HIGH (ref 70–99)
GLUCOSE SERPL-MCNC: 108 MG/DL — HIGH (ref 70–99)
POTASSIUM SERPL-MCNC: 4.3 MMOL/L — SIGNIFICANT CHANGE UP (ref 3.5–5.3)
POTASSIUM SERPL-SCNC: 4.3 MMOL/L — SIGNIFICANT CHANGE UP (ref 3.5–5.3)
SODIUM SERPL-SCNC: 137 MMOL/L — SIGNIFICANT CHANGE UP (ref 135–145)

## 2025-07-09 PROCEDURE — 99232 SBSQ HOSP IP/OBS MODERATE 35: CPT

## 2025-07-09 PROCEDURE — 99238 HOSP IP/OBS DSCHRG MGMT 30/<: CPT

## 2025-07-09 RX ORDER — FUROSEMIDE 10 MG/ML
40 INJECTION INTRAMUSCULAR; INTRAVENOUS
Qty: 0 | Refills: 0 | DISCHARGE
Start: 2025-07-09

## 2025-07-09 RX ORDER — ACETAMINOPHEN 500 MG/5ML
2 LIQUID (ML) ORAL
Qty: 0 | Refills: 0 | DISCHARGE
Start: 2025-07-09

## 2025-07-09 RX ORDER — FUROSEMIDE 10 MG/ML
1 INJECTION INTRAMUSCULAR; INTRAVENOUS
Qty: 30 | Refills: 0
Start: 2025-07-09

## 2025-07-09 RX ADMIN — Medication 650 MILLIGRAM(S): at 19:13

## 2025-07-09 RX ADMIN — Medication 650 MILLIGRAM(S): at 18:13

## 2025-07-09 RX ADMIN — ENOXAPARIN SODIUM 40 MILLIGRAM(S): 100 INJECTION SUBCUTANEOUS at 06:04

## 2025-07-09 RX ADMIN — FUROSEMIDE 40 MILLIGRAM(S): 10 INJECTION INTRAMUSCULAR; INTRAVENOUS at 06:04

## 2025-07-09 RX ADMIN — INSULIN LISPRO 1: 100 INJECTION, SOLUTION INTRAVENOUS; SUBCUTANEOUS at 12:14

## 2025-07-09 RX ADMIN — Medication 1000 UNIT(S): at 12:14

## 2025-07-09 NOTE — DISCHARGE NOTE PROVIDER - NSDCFUSCHEDAPPT_GEN_ALL_CORE_FT
[facility]  WOUNDCARE [address]  Scheduled Appointment: 07/15/2025    Carmela Solis  [facility]  WOUNDCARE [address]  Scheduled Appointment: 07/15/2025

## 2025-07-09 NOTE — DISCHARGE NOTE PROVIDER - HOSPITAL COURSE
A 89-year-old female with a past medical history of chronic lower extremity venous insufficiency, chronic diastolic congestive heart failure (CHF), type 2 diabetes mellitus (DM2), and rheumatoid arthritis presented to the emergency department after a fall at home while attempting to enter. Her daughter reported worsening lower extremity edema in the days preceding the fall. The patient was admitted for further management and evaluation of her fall, edema, and overall medical condition. Questionable compliance with her medication regimen was also noted.    Hospital Course:    Fall Evaluation: The patient's fall was evaluated. No acute injuries were identified. Physical and occupational therapy were consulted, and the patient was deemed appropriate for discharge to a subacute rehabilitation ([facility]) facility for further rehabilitation and monitoring.    Anemia: Mild anemia was noted on admission (Hgb [Value]). There was no evidence of overt bleeding. Given the patient's age and overall clinical condition, a conservative approach was adopted, and transfusion was not deemed necessary at this time. Outpatient colonoscopy was recommended to evaluate for a source of chronic blood loss. Iron supplementation was considered and deferred pending further outpatient workup.    Diastolic CHF: The patient's chronic diastolic CHF was managed with intravenous furosemide 40 mg daily. The transition to oral furosemide was planned for after discharge. The patient's lower extremity edema improved during the hospitalization.    Hypertension (HTN): The patient's hypertension was managed with furosemide. Blood pressure was monitored closely, and no additional antihypertensive agents were required during the hospitalization.    DM2: The patient reported managing her diabetes with diet alone at home. Her HbA1c on admission was 6.5%. Given fluctuations in capillary blood glucose (CBG) readings during hospitalization, an insulin sliding scale regimen was initiated to maintain optimal glycemic control. Hypoglycemia protocol was in place. Dietary counseling was provided regarding carbohydrate control.    Venous Insufficiency and Lower Extremity Wound: The patient has a history of chronic lower extremity venous insufficiency and a lower extremity wound. She reportedly receives outpatient wound care. Wound care physical therapy was consulted, and recommendations were made for ongoing management in the [facility] facility.    Prophylactic Measures: The patient received enoxaparin for deep vein thrombosis (DVT) prophylaxis. Gastrointestinal prophylaxis was not deemed necessary.    Discharge Diagnoses:    Fall  Anemia  Chronic Diastolic Congestive Heart Failure  Hypertension  Type 2 Diabetes Mellitus  Chronic Lower Extremity Venous Insufficiency  Lower Extremity Wound      Discharge Disposition: Discharged to subacute rehabilitation ([facility]).    Condition at Discharge: Stable for discharge to [facility] for continued rehabilitation.

## 2025-07-09 NOTE — DISCHARGE NOTE PROVIDER - NSDCCPCAREPLAN_GEN_ALL_CORE_FT
PRINCIPAL DISCHARGE DIAGNOSIS  Diagnosis: Peripheral edema  Assessment and Plan of Treatment: Medication reconciliation and education provided.  Follow up with primary care physician within 2 weeks.  Follow up with wound care specialist as scheduled.  Schedule outpatient colonoscopy.  Continue blood glucose monitoring and insulin administration as instructed.  Diet recommendations for diabetes management provided and reviewed.  Importance of fall precautions emphasized.  [facility] staff instructed on monitoring for orthostatic hypotension and implementing fall precautions.     PRINCIPAL DISCHARGE DIAGNOSIS  Diagnosis: Acute on chronic diastolic congestive heart failure  Assessment and Plan of Treatment:       SECONDARY DISCHARGE DIAGNOSES  Diagnosis: DM2 (diabetes mellitus, type 2)  Assessment and Plan of Treatment:     Diagnosis: Anemia  Assessment and Plan of Treatment:     Diagnosis: Fall at home  Assessment and Plan of Treatment:     Diagnosis: Diastolic CHF, chronic  Assessment and Plan of Treatment:     Diagnosis: HTN (hypertension)  Assessment and Plan of Treatment:

## 2025-07-09 NOTE — DISCHARGE NOTE PROVIDER - ATTENDING DISCHARGE PHYSICAL EXAMINATION:
GENERAL: NAD  HEAD:  Atraumatic  EYES: PERRLA  ENMT: Mouth moist   NECK: Supple  NERVOUS SYSTEM:  Awake, alert  CHEST/LUNG: Clear  HEART: RRR, S1, S2  ABDOMEN: Soft, non tender  EXTREMITIES:  + edema BL LE, improved  SKIN: No rash

## 2025-07-09 NOTE — DISCHARGE NOTE PROVIDER - NSDCMRMEDTOKEN_GEN_ALL_CORE_FT
furosemide 40 mg oral tablet: 1 tab(s) orally once a day  Tylenol 325 mg oral tablet: 2 tab(s) orally every 6 hours, As Needed  Vaseline topical ointment: Apply topically to affected area once a day  Vitamin D3 1000 intl units (25 mcg) oral tablet: 1 tab(s) orally once a day   acetaminophen 325 mg oral tablet: 2 tab(s) orally every 6 hours As needed Temp greater or equal to 38C (100.4F), Mild Pain (1 - 3)  cholecalciferol oral tablet: 1000 unit(s) orally once a day

## 2025-07-10 ENCOUNTER — TRANSCRIPTION ENCOUNTER (OUTPATIENT)
Age: 89
End: 2025-07-10

## 2025-07-10 VITALS
DIASTOLIC BLOOD PRESSURE: 69 MMHG | TEMPERATURE: 99 F | SYSTOLIC BLOOD PRESSURE: 108 MMHG | HEART RATE: 69 BPM | OXYGEN SATURATION: 95 % | RESPIRATION RATE: 19 BRPM

## 2025-07-10 LAB
GLUCOSE BLDC GLUCOMTR-MCNC: 105 MG/DL — HIGH (ref 70–99)
GLUCOSE BLDC GLUCOMTR-MCNC: 137 MG/DL — HIGH (ref 70–99)
GLUCOSE BLDC GLUCOMTR-MCNC: 196 MG/DL — HIGH (ref 70–99)

## 2025-07-10 PROCEDURE — 99239 HOSP IP/OBS DSCHRG MGMT >30: CPT

## 2025-07-10 RX ADMIN — ENOXAPARIN SODIUM 40 MILLIGRAM(S): 100 INJECTION SUBCUTANEOUS at 06:52

## 2025-07-10 RX ADMIN — INSULIN LISPRO 1: 100 INJECTION, SOLUTION INTRAVENOUS; SUBCUTANEOUS at 12:24

## 2025-07-10 RX ADMIN — Medication 1000 UNIT(S): at 12:27

## 2025-07-10 RX ADMIN — FUROSEMIDE 40 MILLIGRAM(S): 10 INJECTION INTRAMUSCULAR; INTRAVENOUS at 06:52

## 2025-07-10 NOTE — DISCHARGE NOTE NURSING/CASE MANAGEMENT/SOCIAL WORK - NSTRANSFERDENTURES_GEN_A_NUR
Labs today  Schedule colonoscopy at your earliest convenience by calling the following number:  Marissa  :[phone].  You are due for mammogram.  Please call the following number to make appointment :  [phone]  It is located in [address]  Follow up in one year for physical   Seek sooner medical attention if there is any worsening of symptoms or problems.    partial/upper/lower

## 2025-07-10 NOTE — DIETITIAN INITIAL EVALUATION ADULT - OTHER INFO
Pt reports height of 5'3",  lbs, denied any recent weight changes, eating well, no complaints and declined need for any nutrition interventions at this time. Encouraged pt to get enough protein in diet to help promote healing and ask for oral supplements if she feels she needs them. Pt verbalized understanding. Noted pt to be discharged to [facility] for continued therapy services.

## 2025-07-10 NOTE — PROGRESS NOTE ADULT - ASSESSMENT
89-year-old female with past medical history of chronic lower extremity venous insufficiency, chronic diastolic CHF, type 2 diabetes, rheumatoid arthritis, who presented to the ED after a fall while trying to get in the house. Daughter reports that lower extremity swelling has been worsening over the last few days. Admit to medicine for further management.   Pt was seen and examined, no acute events.       Problem/Plan - 1:  ·  Problem: Fall at home.   ·  Plan: - fell at home  - worsening lower extremity edema   - questionable compliance to medications  - Lasix 40 mg iv qd  - PT eval : [facility]     Problem/Plan - 2:  ·  Problem: Anemia.   ·  Plan: - mild   - No signs of overt bleeding  - f/u iron panel, CBC  - transfuse for hgb less than 7  - colonoscopy outpatient as appropriate.     Problem/Plan - 3:  ·  Problem: Diastolic CHF, chronic.   ·  Plan: resume lasix.     Problem/Plan - 4:  ·  Problem: HTN (hypertension).   ·  Plan: c/w lasix  continue to monitor and adjust as needed.     Problem/Plan - 5:  ·  Problem: DM2 (diabetes mellitus, type 2).   ·  Plan: - Home meds: diet controlled  - start  ISS, POC BG achs>> calculate basal bolus based on need  - hypoglycemia protocol in place  - carb control diet  - f/u A1c.     Problem/Plan - 6:  ·  Problem: Prophylactic measure.   ·  Plan: - DVT ppx: lovenox SQ  - GI ppx: not required  - Reassess need daily.    LE wound:   - Pt sees wound care outpt  - Wound PT consulted  
89-year-old female with past medical history of chronic lower extremity venous insufficiency, chronic diastolic CHF, type 2 diabetes, rheumatoid arthritis, who presented to the ED after a fall while trying to get in the house. Daughter reports that lower extremity swelling has been worsening over the last few days. Admit to medicine for further management.   Pt was seen and examined, no acute events.       Problem/Plan - 1:  ·  Problem: Fall at home.   ·  Plan: - fell at home  - worsening lower extremity edema   - questionable compliance to medications  - Lasix 40 mg iv qd  - PT eval when able to tolerate.     Problem/Plan - 2:  ·  Problem: Anemia.   ·  Plan: - mild   - No signs of overt bleeding  - f/u iron panel, CBC  - transfuse for hgb less than 7  - colonoscopy outpatient as appropriate.     Problem/Plan - 3:  ·  Problem: Diastolic CHF, chronic.   ·  Plan: resume lasix.     Problem/Plan - 4:  ·  Problem: HTN (hypertension).   ·  Plan: c/w lasix  continue to monitor and adjust as needed.     Problem/Plan - 5:  ·  Problem: DM2 (diabetes mellitus, type 2).   ·  Plan: - Home meds: diet controlled  - start  ISS, POC BG achs>> calculate basal bolus based on need  - hypoglycemia protocol in place  - carb control diet  - f/u A1c.     Problem/Plan - 6:  ·  Problem: Prophylactic measure.   ·  Plan: - DVT ppx: lovenox SQ  - GI ppx: not required  - Reassess need daily.    LE wound:   - Pt sees wound care outpt  - Wound PT consulted  
89-year-old female with past medical history of chronic lower extremity venous insufficiency, chronic diastolic CHF, type 2 diabetes, rheumatoid arthritis, who presented to the ED after a fall while trying to get in the house. Daughter reports that lower extremity swelling has been worsening over the last few days. Admit to medicine for further management.   Pt was seen and examined, no acute events.     Problem/Plan - 1:  ·  Problem: Fall at home.   ·  Plan: - fell at home  - worsening lower extremity edema   - questionable compliance to medications  - Lasix 40 mg iv qd, can switch to PO in next 24-48 hrs  - PT eval : [facility]     Problem/Plan - 2:  ·  Problem: Anemia.   ·  Plan: - mild   - No signs of overt bleeding  - transfuse for hgb less than 7  - colonoscopy outpatient as appropriate.     Problem/Plan - 3:  ·  Problem: Diastolic CHF, chronic.   ·  Plan: - Lasix 40 mg iv qd, switch to PO in next 24-48 hrs     Problem/Plan - 4:  ·  Problem: HTN (hypertension).   ·  Plan: c/w Lasix  continue to monitor and adjust as needed.     Problem/Plan - 5:  ·  Problem: DM2 (diabetes mellitus, type 2).   ·  Plan: - Home meds: diet controlled  - A1c 6.5, insulin SS  - hypoglycemia protocol in place  - carb control diet     Problem/Plan - 6:  ·  Problem: Prophylactic measure.   ·  Plan: - DVT ppx: lovenox SQ  - GI ppx: not required  - Reassess need daily.    LE wound:   - Pt sees wound care outpt  - Wound PT consulted  
A 89-year-old female with a past medical history of chronic lower extremity venous insufficiency, chronic diastolic congestive heart failure (CHF), type 2 diabetes mellitus (DM2), and rheumatoid arthritis presented to the emergency department after a fall at home while attempting to enter. Her daughter reported worsening lower extremity edema in the days preceding the fall. The patient was admitted for further management and evaluation of her fall, edema, and overall medical condition. Questionable compliance with her medication regimen was also noted.    Hospital Course:    Fall Evaluation: The patient's fall was evaluated. No acute injuries were identified. Physical and occupational therapy were consulted, and the patient was deemed appropriate for discharge to a subacute rehabilitation ([facility]) facility for further rehabilitation and monitoring.    Anemia: Mild anemia was noted on admission (Hgb [Value]). There was no evidence of overt bleeding. Given the patient's age and overall clinical condition, a conservative approach was adopted, and transfusion was not deemed necessary at this time. Outpatient colonoscopy was recommended to evaluate for a source of chronic blood loss. Iron supplementation was considered and deferred pending further outpatient workup.    Diastolic CHF: The patient's chronic diastolic CHF was managed with intravenous furosemide 40 mg daily. The transition to oral furosemide was planned for after discharge. The patient's lower extremity edema improved during the hospitalization.    Hypertension (HTN): The patient's hypertension was managed with furosemide. Blood pressure was monitored closely, and no additional antihypertensive agents were required during the hospitalization.    DM2: The patient reported managing her diabetes with diet alone at home. Her HbA1c on admission was 6.5%. Given fluctuations in capillary blood glucose (CBG) readings during hospitalization, an insulin sliding scale regimen was initiated to maintain optimal glycemic control. Hypoglycemia protocol was in place. Dietary counseling was provided regarding carbohydrate control.    Venous Insufficiency and Lower Extremity Wound: The patient has a history of chronic lower extremity venous insufficiency and a lower extremity wound. She reportedly receives outpatient wound care. Wound care physical therapy was consulted, and recommendations were made for ongoing management in the [facility] facility.    Prophylactic Measures: The patient received enoxaparin for deep vein thrombosis (DVT) prophylaxis. Gastrointestinal prophylaxis was not deemed necessary.    Discharge Diagnoses:    Fall  Anemia  Chronic Diastolic Congestive Heart Failure  Hypertension  Type 2 Diabetes Mellitus  Chronic Lower Extremity Venous Insufficiency  Lower Extremity Wound      Discharge Disposition: Discharged to subacute rehabilitation ([facility]).    Condition at Discharge: Stable for discharge to [facility] for continued rehabilitation.  
89-year-old female with past medical history of chronic lower extremity venous insufficiency, chronic diastolic CHF, type 2 diabetes, rheumatoid arthritis, who presented to the ED after a fall while trying to get in the house. Daughter reports that lower extremity swelling has been worsening over the last few days. Admit to medicine for further management.   Pt was seen and examined, no acute events.     Problem/Plan - 1:  ·  Problem: Fall at home.   ·  Plan: - fell at home  - worsening lower extremity edema   - questionable compliance to medications  - Lasix 40 mg iv qd  - PT eval : [facility]     Problem/Plan - 2:  ·  Problem: Anemia.   ·  Plan: - mild   - No signs of overt bleeding  - transfuse for hgb less than 7  - colonoscopy outpatient as appropriate.     Problem/Plan - 3:  ·  Problem: Diastolic CHF, chronic.   ·  Plan: - Lasix 40 mg iv qd, switch to PO in am     Problem/Plan - 4:  ·  Problem: HTN (hypertension).   ·  Plan: c/w Lasix  continue to monitor and adjust as needed.     Problem/Plan - 5:  ·  Problem: DM2 (diabetes mellitus, type 2).   ·  Plan: - Home meds: diet controlled  - A1c 6.5, insulin SS  - hypoglycemia protocol in place  - carb control diet     Problem/Plan - 6:  ·  Problem: Prophylactic measure.   ·  Plan: - DVT ppx: lovenox SQ  - GI ppx: not required  - Reassess need daily.    LE wound:   - Pt sees wound care outpt  - Wound PT consulted

## 2025-07-10 NOTE — DIETITIAN INITIAL EVALUATION ADULT - PERTINENT MEDS FT
MEDICATIONS  (STANDING):  cholecalciferol 1000 Unit(s) Oral daily  dextrose 5%. 1000 milliLiter(s) (50 mL/Hr) IV Continuous <Continuous>  dextrose 5%. 1000 milliLiter(s) (100 mL/Hr) IV Continuous <Continuous>  dextrose 50% Injectable 25 Gram(s) IV Push once  dextrose 50% Injectable 12.5 Gram(s) IV Push once  dextrose 50% Injectable 25 Gram(s) IV Push once  enoxaparin Injectable 40 milliGRAM(s) SubCutaneous every 24 hours  furosemide   Injectable 40 milliGRAM(s) IV Push daily  glucagon  Injectable 1 milliGRAM(s) IntraMuscular once  insulin lispro (ADMELOG) corrective regimen sliding scale   SubCutaneous three times a day before meals    MEDICATIONS  (PRN):  acetaminophen     Tablet .. 650 milliGRAM(s) Oral every 6 hours PRN Temp greater or equal to 38C (100.4F), Mild Pain (1 - 3)  dextrose Oral Gel 15 Gram(s) Oral once PRN Blood Glucose LESS THAN 70 milliGRAM(s)/deciliter

## 2025-07-10 NOTE — DIETITIAN INITIAL EVALUATION ADULT - PERTINENT LABORATORY DATA
07-09    137  |  98  |  28[H]  ----------------------------<  108[H]  4.3   |  31  |  0.66    Ca    9.2      09 Jul 2025 06:25    POCT Blood Glucose.: 196 mg/dL (07-10-25 @ 11:19)  A1C with Estimated Average Glucose Result: 6.5 % (07-05-25 @ 08:25)

## 2025-07-10 NOTE — DISCHARGE NOTE NURSING/CASE MANAGEMENT/SOCIAL WORK - PATIENT PORTAL LINK FT
You can access the FollowMyHealth Patient Portal offered by [facility] by registering at the following website: [email or web address]. By joining [employer]’s FollowMyHealth portal, you will also be able to view your health information using other applications (apps) compatible with our system.

## 2025-07-10 NOTE — PROGRESS NOTE ADULT - SUBJECTIVE AND OBJECTIVE BOX
Patient is a 89y old  Female who presents with a chief complaint of Fall (05 Jul 2025 12:24)      INTERVAL HPI/OVERNIGHT EVENTS:  Pt was seen and examined, no acute events.      MEDICATIONS  (STANDING):  cholecalciferol 1000 Unit(s) Oral daily  dextrose 5%. 1000 milliLiter(s) (50 mL/Hr) IV Continuous <Continuous>  dextrose 5%. 1000 milliLiter(s) (100 mL/Hr) IV Continuous <Continuous>  dextrose 50% Injectable 25 Gram(s) IV Push once  dextrose 50% Injectable 12.5 Gram(s) IV Push once  dextrose 50% Injectable 25 Gram(s) IV Push once  enoxaparin Injectable 40 milliGRAM(s) SubCutaneous every 24 hours  furosemide   Injectable 40 milliGRAM(s) IV Push daily  glucagon  Injectable 1 milliGRAM(s) IntraMuscular once  insulin lispro (ADMELOG) corrective regimen sliding scale   SubCutaneous three times a day before meals    MEDICATIONS  (PRN):  acetaminophen     Tablet .. 650 milliGRAM(s) Oral every 6 hours PRN Temp greater or equal to 38C (100.4F), Mild Pain (1 - 3)  dextrose Oral Gel 15 Gram(s) Oral once PRN Blood Glucose LESS THAN 70 milliGRAM(s)/deciliter      Allergies    No Known Allergies    Intolerances          Vital Signs Last 24 Hrs  T(C): 36.6 (06 Jul 2025 12:00), Max: 36.9 (05 Jul 2025 16:49)  T(F): 97.9 (06 Jul 2025 12:00), Max: 98.5 (05 Jul 2025 16:49)  HR: 70 (06 Jul 2025 12:00) (70 - 86)  BP: 123/70 (06 Jul 2025 12:00) (120/76 - 126/68)  BP(mean): --  RR: 17 (06 Jul 2025 12:00) (17 - 18)  SpO2: 96% (06 Jul 2025 12:00) (95% - 98%)    Parameters below as of 06 Jul 2025 12:00  Patient On (Oxygen Delivery Method): room air        PHYSICAL EXAM:  GENERAL: NAD  HEAD:  Atraumatic  EYES: PERRLA  ENMT: Mouth moist   NECK: Supple  NERVOUS SYSTEM:  Awake, alert  CHEST/LUNG: Clear  HEART: RRR, S1, S2  ABDOMEN: Soft, non tender  EXTREMITIES:  no edema BL LE  SKIN: No rash      LABS:    07-06    136  |  103  |  23  ----------------------------<  125[H]  4.0   |  29  |  0.57    Ca    8.5      06 Jul 2025 05:15        Urinalysis Basic - ( 06 Jul 2025 05:15 )    Color: x / Appearance: x / SG: x / pH: x  Gluc: 125 mg/dL / Ketone: x  / Bili: x / Urobili: x   Blood: x / Protein: x / Nitrite: x   Leuk Esterase: x / RBC: x / WBC x   Sq Epi: x / Non Sq Epi: x / Bacteria: x      CAPILLARY BLOOD GLUCOSE      POCT Blood Glucose.: 143 mg/dL (06 Jul 2025 11:19)  POCT Blood Glucose.: 119 mg/dL (06 Jul 2025 08:33)  POCT Blood Glucose.: 114 mg/dL (05 Jul 2025 21:17)  POCT Blood Glucose.: 149 mg/dL (05 Jul 2025 16:33)      RADIOLOGY & ADDITIONAL TESTS:    Imaging Personally Reviewed:  [ ] YES  [ ] NO    Consultant(s) Notes Reviewed:  [ ] YES  [ ] NO    Care Discussed with Consultants/Other Providers [ ] YES  [ ] NO
Patient is a 89y old  Female who presents with a chief complaint of Fall (07 Jul 2025 13:59)      INTERVAL HPI/OVERNIGHT EVENTS:  Pt was seen and examined, no acute events.      MEDICATIONS  (STANDING):  cholecalciferol 1000 Unit(s) Oral daily  dextrose 5%. 1000 milliLiter(s) (50 mL/Hr) IV Continuous <Continuous>  dextrose 5%. 1000 milliLiter(s) (100 mL/Hr) IV Continuous <Continuous>  dextrose 50% Injectable 25 Gram(s) IV Push once  dextrose 50% Injectable 12.5 Gram(s) IV Push once  dextrose 50% Injectable 25 Gram(s) IV Push once  enoxaparin Injectable 40 milliGRAM(s) SubCutaneous every 24 hours  furosemide   Injectable 40 milliGRAM(s) IV Push daily  glucagon  Injectable 1 milliGRAM(s) IntraMuscular once  insulin lispro (ADMELOG) corrective regimen sliding scale   SubCutaneous three times a day before meals    MEDICATIONS  (PRN):  acetaminophen     Tablet .. 650 milliGRAM(s) Oral every 6 hours PRN Temp greater or equal to 38C (100.4F), Mild Pain (1 - 3)  dextrose Oral Gel 15 Gram(s) Oral once PRN Blood Glucose LESS THAN 70 milliGRAM(s)/deciliter      Allergies  No Known Allergies        Vital Signs Last 24 Hrs  T(C): 36.7 (08 Jul 2025 12:35), Max: 37.2 (07 Jul 2025 23:24)  T(F): 98.1 (08 Jul 2025 12:35), Max: 99 (07 Jul 2025 23:24)  HR: 72 (08 Jul 2025 12:35) (72 - 78)  BP: 109/67 (08 Jul 2025 12:35) (109/67 - 121/72)  BP(mean): --  RR: 17 (08 Jul 2025 12:35) (17 - 19)  SpO2: 97% (08 Jul 2025 12:35) (96% - 97%)    Parameters below as of 08 Jul 2025 12:35  Patient On (Oxygen Delivery Method): room air        PHYSICAL EXAM:  GENERAL: NAD  HEAD:  Atraumatic  EYES: PERRLA  ENMT: Mouth moist   NECK: Supple  NERVOUS SYSTEM:  Awake, alert  CHEST/LUNG: Clear  HEART: RRR, S1, S2  ABDOMEN: Soft, non tender  EXTREMITIES:  + edema BL LE, improved  SKIN: No rash          LABS:    07-08    137  |  100  |  26[H]  ----------------------------<  110[H]  3.7   |  31  |  0.59    Ca    9.0      08 Jul 2025 06:59        Urinalysis Basic - ( 08 Jul 2025 06:59 )    Color: x / Appearance: x / SG: x / pH: x  Gluc: 110 mg/dL / Ketone: x  / Bili: x / Urobili: x   Blood: x / Protein: x / Nitrite: x   Leuk Esterase: x / RBC: x / WBC x   Sq Epi: x / Non Sq Epi: x / Bacteria: x      CAPILLARY BLOOD GLUCOSE      POCT Blood Glucose.: 122 mg/dL (08 Jul 2025 16:22)  POCT Blood Glucose.: 147 mg/dL (08 Jul 2025 11:19)  POCT Blood Glucose.: 113 mg/dL (08 Jul 2025 08:29)  POCT Blood Glucose.: 130 mg/dL (07 Jul 2025 21:55)  POCT Blood Glucose.: 104 mg/dL (07 Jul 2025 16:43)      RADIOLOGY & ADDITIONAL TESTS:    Imaging Personally Reviewed:  [ ] YES  [ ] NO    Consultant(s) Notes Reviewed:  [ ] YES  [ ] NO    Care Discussed with Consultants/Other Providers [ ] YES  [ ] NO
Patient is a 89y old  Female who presents with a chief complaint of Fall (10 Jul 2025 12:45)    INTERVAL HPI/OVERNIGHT EVENTS: Patients seen and examined at bedside this morning. No acute events overnight.    MEDICATIONS  (STANDING):  cholecalciferol 1000 Unit(s) Oral daily  dextrose 5%. 1000 milliLiter(s) (50 mL/Hr) IV Continuous <Continuous>  dextrose 5%. 1000 milliLiter(s) (100 mL/Hr) IV Continuous <Continuous>  dextrose 50% Injectable 25 Gram(s) IV Push once  dextrose 50% Injectable 12.5 Gram(s) IV Push once  dextrose 50% Injectable 25 Gram(s) IV Push once  enoxaparin Injectable 40 milliGRAM(s) SubCutaneous every 24 hours  furosemide   Injectable 40 milliGRAM(s) IV Push daily  glucagon  Injectable 1 milliGRAM(s) IntraMuscular once  insulin lispro (ADMELOG) corrective regimen sliding scale   SubCutaneous three times a day before meals    MEDICATIONS  (PRN):  acetaminophen     Tablet .. 650 milliGRAM(s) Oral every 6 hours PRN Temp greater or equal to 38C (100.4F), Mild Pain (1 - 3)  dextrose Oral Gel 15 Gram(s) Oral once PRN Blood Glucose LESS THAN 70 milliGRAM(s)/deciliter    Allergies    No Known Allergies    Intolerances      REVIEW OF SYSTEMS:  All other systems reviewed and are negative    Vital Signs Last 24 Hrs  T(C): 36.4 (10 Jul 2025 11:04), Max: 37 (2025 16:33)  T(F): 97.6 (10 Jul 2025 11:04), Max: 98.6 (2025 16:33)  HR: 71 (10 Jul 2025 11:04) (67 - 77)  BP: 109/72 (10 Jul 2025 11:04) (109/72 - 115/73)  BP(mean): --  RR: 16 (10 Jul 2025 11:04) (16 - 19)  SpO2: 95% (10 Jul 2025 11:04) (94% - 95%)    Parameters below as of 10 Jul 2025 11:04  Patient On (Oxygen Delivery Method): room air      Daily     Daily Weight in k.2 (10 Jul 2025 04:37)  I&O's Summary    2025 07:01  -  10 Jul 2025 07:00  --------------------------------------------------------  IN: 200 mL / OUT: 950 mL / NET: -750 mL    10 Jul 2025 07:01  -  10 Jul 2025 16:21  --------------------------------------------------------  IN: 0 mL / OUT: 1000 mL / NET: -1000 mL      CAPILLARY BLOOD GLUCOSE      POCT Blood Glucose.: 137 mg/dL (10 Jul 2025 16:16)  POCT Blood Glucose.: 196 mg/dL (10 Jul 2025 11:19)  POCT Blood Glucose.: 105 mg/dL (10 Jul 2025 08:13)  POCT Blood Glucose.: 136 mg/dL (2025 21:27)    PHYSICAL EXAM:  GENERAL: NAD  HEAD:  Atraumatic  EYES: PERRLA  ENMT: Mouth moist   NECK: Supple  NERVOUS SYSTEM:  Awake, alert  CHEST/LUNG: Clear  HEART: RRR, S1, S2  ABDOMEN: Soft, non tender  EXTREMITIES:  + edema BL LE, improved  SKIN: No rash    Labs          137  |  98  |  28[H]  ----------------------------<  108[H]  4.3   |  31  |  0.66    Ca    9.2      2025 06:25            Urinalysis Basic - ( 2025 06:25 )    Color: x / Appearance: x / SG: x / pH: x  Gluc: 108 mg/dL / Ketone: x  / Bili: x / Urobili: x   Blood: x / Protein: x / Nitrite: x   Leuk Esterase: x / RBC: x / WBC x   Sq Epi: x / Non Sq Epi: x / Bacteria: x                      Radiology and Imaging reviewed.
Patient is a 89y old  Female who presents with a chief complaint of Fall (06 Jul 2025 13:43)      INTERVAL HPI/OVERNIGHT EVENTS:  Pt was seen and examined, no acute events.      MEDICATIONS  (STANDING):  cholecalciferol 1000 Unit(s) Oral daily  dextrose 5%. 1000 milliLiter(s) (50 mL/Hr) IV Continuous <Continuous>  dextrose 5%. 1000 milliLiter(s) (100 mL/Hr) IV Continuous <Continuous>  dextrose 50% Injectable 25 Gram(s) IV Push once  dextrose 50% Injectable 12.5 Gram(s) IV Push once  dextrose 50% Injectable 25 Gram(s) IV Push once  enoxaparin Injectable 40 milliGRAM(s) SubCutaneous every 24 hours  furosemide   Injectable 40 milliGRAM(s) IV Push daily  glucagon  Injectable 1 milliGRAM(s) IntraMuscular once  insulin lispro (ADMELOG) corrective regimen sliding scale   SubCutaneous three times a day before meals    MEDICATIONS  (PRN):  acetaminophen     Tablet .. 650 milliGRAM(s) Oral every 6 hours PRN Temp greater or equal to 38C (100.4F), Mild Pain (1 - 3)  dextrose Oral Gel 15 Gram(s) Oral once PRN Blood Glucose LESS THAN 70 milliGRAM(s)/deciliter      Allergies  No Known Allergies        Vital Signs Last 24 Hrs  T(C): 36.8 (07 Jul 2025 11:13), Max: 36.9 (06 Jul 2025 23:24)  T(F): 98.3 (07 Jul 2025 11:13), Max: 98.4 (06 Jul 2025 23:24)  HR: 75 (07 Jul 2025 11:13) (72 - 90)  BP: 109/67 (07 Jul 2025 11:13) (109/67 - 132/82)  BP(mean): --  RR: 17 (07 Jul 2025 11:13) (16 - 18)  SpO2: 96% (07 Jul 2025 11:13) (96% - 97%)        PHYSICAL EXAM:  GENERAL: NAD  HEAD:  Atraumatic  EYES: PERRLA  ENMT: Mouth moist   NECK: Supple  NERVOUS SYSTEM:  Awake, alert  CHEST/LUNG: Clear  HEART: RRR, S1, S2  ABDOMEN: Soft, non tender  EXTREMITIES:  no edema BL LE  SKIN: No rash          LABS:    07-06    136  |  103  |  23  ----------------------------<  125[H]  4.0   |  29  |  0.57    Ca    8.5      06 Jul 2025 05:15        Urinalysis Basic - ( 06 Jul 2025 05:15 )    Color: x / Appearance: x / SG: x / pH: x  Gluc: 125 mg/dL / Ketone: x  / Bili: x / Urobili: x   Blood: x / Protein: x / Nitrite: x   Leuk Esterase: x / RBC: x / WBC x   Sq Epi: x / Non Sq Epi: x / Bacteria: x      CAPILLARY BLOOD GLUCOSE      POCT Blood Glucose.: 162 mg/dL (07 Jul 2025 11:27)  POCT Blood Glucose.: 114 mg/dL (07 Jul 2025 08:30)  POCT Blood Glucose.: 186 mg/dL (06 Jul 2025 21:28)  POCT Blood Glucose.: 92 mg/dL (06 Jul 2025 16:35)      RADIOLOGY & ADDITIONAL TESTS:    Imaging Personally Reviewed:  [ ] YES  [ ] NO    Consultant(s) Notes Reviewed:  [ ] YES  [ ] NO    Care Discussed with Consultants/Other Providers [ ] YES  [ ] NO
Patient is a 89y old  Female who presents with a chief complaint of Fall (04 Jul 2025 00:59)      INTERVAL HPI/OVERNIGHT EVENTS:  Pt was seen and examined, no acute events.      MEDICATIONS  (STANDING):  cholecalciferol 1000 Unit(s) Oral daily  dextrose 5%. 1000 milliLiter(s) (50 mL/Hr) IV Continuous <Continuous>  dextrose 5%. 1000 milliLiter(s) (100 mL/Hr) IV Continuous <Continuous>  dextrose 50% Injectable 25 Gram(s) IV Push once  dextrose 50% Injectable 12.5 Gram(s) IV Push once  dextrose 50% Injectable 25 Gram(s) IV Push once  enoxaparin Injectable 40 milliGRAM(s) SubCutaneous every 24 hours  furosemide   Injectable 40 milliGRAM(s) IV Push daily  glucagon  Injectable 1 milliGRAM(s) IntraMuscular once  insulin lispro (ADMELOG) corrective regimen sliding scale   SubCutaneous three times a day before meals    MEDICATIONS  (PRN):  acetaminophen     Tablet .. 650 milliGRAM(s) Oral every 6 hours PRN Temp greater or equal to 38C (100.4F), Mild Pain (1 - 3)  dextrose Oral Gel 15 Gram(s) Oral once PRN Blood Glucose LESS THAN 70 milliGRAM(s)/deciliter      Allergies  No Known Allergies        Vital Signs Last 24 Hrs  T(C): 36.5 (05 Jul 2025 10:45), Max: 36.9 (05 Jul 2025 05:20)  T(F): 97.7 (05 Jul 2025 10:45), Max: 98.4 (05 Jul 2025 05:20)  HR: 76 (05 Jul 2025 10:45) (74 - 79)  BP: 95/60 (05 Jul 2025 10:45) (95/60 - 155/86)  BP(mean): --  RR: 18 (05 Jul 2025 10:45) (18 - 19)  SpO2: 96% (05 Jul 2025 10:45) (95% - 96%)    Parameters below as of 05 Jul 2025 10:45  Patient On (Oxygen Delivery Method): room air        PHYSICAL EXAM:  GENERAL: NAD  HEAD:  Atraumatic  EYES: PERRLA  ENMT: Mouth moist   NECK: Supple  NERVOUS SYSTEM:  Awake, alert  CHEST/LUNG: Clear  HEART: RRR, S1, S2  ABDOMEN: Soft, non tender  EXTREMITIES:  no edema BL LE  SKIN: No rash        LABS:                        11.2   5.03  )-----------( 347      ( 04 Jul 2025 10:45 )             35.9     07-04    139  |  102  |  17  ----------------------------<  233[H]  3.5   |  30  |  0.80    Ca    8.7      04 Jul 2025 10:45    TPro  8.2  /  Alb  3.1[L]  /  TBili  0.6  /  DBili  x   /  AST  23  /  ALT  24  /  AlkPhos  114  07-04      Urinalysis Basic - ( 04 Jul 2025 10:45 )    Color: x / Appearance: x / SG: x / pH: x  Gluc: 233 mg/dL / Ketone: x  / Bili: x / Urobili: x   Blood: x / Protein: x / Nitrite: x   Leuk Esterase: x / RBC: x / WBC x   Sq Epi: x / Non Sq Epi: x / Bacteria: x      CAPILLARY BLOOD GLUCOSE      POCT Blood Glucose.: 139 mg/dL (05 Jul 2025 11:20)  POCT Blood Glucose.: 110 mg/dL (05 Jul 2025 08:18)  POCT Blood Glucose.: 139 mg/dL (04 Jul 2025 21:06)  POCT Blood Glucose.: 134 mg/dL (04 Jul 2025 16:15)      RADIOLOGY & ADDITIONAL TESTS:    Imaging Personally Reviewed:  [ ] YES  [ ] NO    Consultant(s) Notes Reviewed:  [ ] YES  [ ] NO    Care Discussed with Consultants/Other Providers [ ] YES  [ ] NO

## 2025-07-10 NOTE — DISCHARGE NOTE NURSING/CASE MANAGEMENT/SOCIAL WORK - FINANCIAL ASSISTANCE
[facility] provides services at a reduced cost to those who are determined to be eligible through [facility]’s financial assistance program. Information regarding [facility]’s financial assistance program can be found by going to [email or web address] or by calling [phone].

## 2025-07-10 NOTE — DIETITIAN INITIAL EVALUATION ADULT - OTHER CALCULATIONS
Used 79.2 kg bed scale weight obtained 7/10 to calculate energy and protein needs for this assessment

## 2025-07-14 ENCOUNTER — NON-APPOINTMENT (OUTPATIENT)
Age: 89
End: 2025-07-14

## 2025-07-14 DIAGNOSIS — I87.2 VENOUS INSUFFICIENCY (CHRONIC) (PERIPHERAL): ICD-10-CM

## 2025-07-14 DIAGNOSIS — I50.32 CHRONIC DIASTOLIC (CONGESTIVE) HEART FAILURE: ICD-10-CM

## 2025-07-14 DIAGNOSIS — R60.9 EDEMA, UNSPECIFIED: ICD-10-CM

## 2025-07-14 DIAGNOSIS — E11.628 TYPE 2 DIABETES MELLITUS WITH OTHER SKIN COMPLICATIONS: ICD-10-CM

## 2025-07-14 DIAGNOSIS — I87.333 CHRONIC VENOUS HYPERTENSION (IDIOPATHIC) WITH ULCER AND INFLAMMATION OF BILATERAL LOWER EXTREMITY: ICD-10-CM

## 2025-07-14 DIAGNOSIS — I35.0 NONRHEUMATIC AORTIC (VALVE) STENOSIS: ICD-10-CM

## 2025-07-15 ENCOUNTER — OUTPATIENT (OUTPATIENT)
Dept: OUTPATIENT SERVICES | Facility: HOSPITAL | Age: 89
LOS: 1 days | End: 2025-07-15
Payer: MEDICARE

## 2025-07-15 ENCOUNTER — APPOINTMENT (OUTPATIENT)
Dept: WOUND CARE | Facility: HOSPITAL | Age: 89
End: 2025-07-15

## 2025-07-15 ENCOUNTER — APPOINTMENT (OUTPATIENT)
Dept: WOUND CARE | Facility: HOSPITAL | Age: 89
End: 2025-07-15
Payer: MEDICARE

## 2025-07-15 VITALS
RESPIRATION RATE: 16 BRPM | SYSTOLIC BLOOD PRESSURE: 120 MMHG | HEART RATE: 62 BPM | TEMPERATURE: 98.3 F | DIASTOLIC BLOOD PRESSURE: 75 MMHG

## 2025-07-15 DIAGNOSIS — Z98.890 OTHER SPECIFIED POSTPROCEDURAL STATES: Chronic | ICD-10-CM

## 2025-07-15 DIAGNOSIS — R06.9 UNSPECIFIED ABNORMALITIES OF BREATHING: ICD-10-CM

## 2025-07-15 DIAGNOSIS — I11.0 HYPERTENSIVE HEART DISEASE WITH HEART FAILURE: ICD-10-CM

## 2025-07-15 DIAGNOSIS — D64.9 ANEMIA, UNSPECIFIED: ICD-10-CM

## 2025-07-15 DIAGNOSIS — M06.9 RHEUMATOID ARTHRITIS, UNSPECIFIED: ICD-10-CM

## 2025-07-15 DIAGNOSIS — E11.51 TYPE 2 DIABETES MELLITUS WITH DIABETIC PERIPHERAL ANGIOPATHY WITHOUT GANGRENE: ICD-10-CM

## 2025-07-15 DIAGNOSIS — T50.1X6A UNDERDOSING OF LOOP [HIGH-CEILING] DIURETICS, INITIAL ENCOUNTER: ICD-10-CM

## 2025-07-15 DIAGNOSIS — I50.33 ACUTE ON CHRONIC DIASTOLIC (CONGESTIVE) HEART FAILURE: ICD-10-CM

## 2025-07-15 DIAGNOSIS — I87.2 VENOUS INSUFFICIENCY (CHRONIC) (PERIPHERAL): ICD-10-CM

## 2025-07-15 PROBLEM — S81.809D: Status: ACTIVE | Noted: 2025-07-15

## 2025-07-15 PROBLEM — I50.32 CHRONIC DIASTOLIC (CONGESTIVE) HEART FAILURE: Chronic | Status: ACTIVE | Noted: 2025-07-04

## 2025-07-15 PROCEDURE — G0463: CPT

## 2025-07-15 PROCEDURE — 93922 UPR/L XTREMITY ART 2 LEVELS: CPT

## 2025-07-15 PROCEDURE — 93922 UPR/L XTREMITY ART 2 LEVELS: CPT | Mod: 26

## 2025-07-15 PROCEDURE — 99214 OFFICE O/P EST MOD 30 MIN: CPT

## 2025-07-23 DIAGNOSIS — I89.0 LYMPHEDEMA, NOT ELSEWHERE CLASSIFIED: ICD-10-CM

## 2025-07-23 DIAGNOSIS — I50.32 CHRONIC DIASTOLIC (CONGESTIVE) HEART FAILURE: ICD-10-CM

## 2025-07-23 DIAGNOSIS — I77.1 STRICTURE OF ARTERY: ICD-10-CM

## 2025-07-23 DIAGNOSIS — S81.809D UNSPECIFIED OPEN WOUND, UNSPECIFIED LOWER LEG, SUBSEQUENT ENCOUNTER: ICD-10-CM

## 2025-08-05 ENCOUNTER — OUTPATIENT (OUTPATIENT)
Dept: OUTPATIENT SERVICES | Facility: HOSPITAL | Age: 89
LOS: 1 days | End: 2025-08-05
Payer: MEDICARE

## 2025-08-05 ENCOUNTER — APPOINTMENT (OUTPATIENT)
Dept: WOUND CARE | Facility: HOSPITAL | Age: 89
End: 2025-08-05
Payer: MEDICARE

## 2025-08-05 VITALS
OXYGEN SATURATION: 97 % | HEART RATE: 70 BPM | TEMPERATURE: 98.1 F | RESPIRATION RATE: 16 BRPM | DIASTOLIC BLOOD PRESSURE: 76 MMHG | SYSTOLIC BLOOD PRESSURE: 125 MMHG

## 2025-08-05 DIAGNOSIS — Z87.828 PERSONAL HISTORY OF OTHER (HEALED) PHYSICAL INJURY AND TRAUMA: ICD-10-CM

## 2025-08-05 DIAGNOSIS — Z98.890 OTHER SPECIFIED POSTPROCEDURAL STATES: Chronic | ICD-10-CM

## 2025-08-05 PROCEDURE — 99214 OFFICE O/P EST MOD 30 MIN: CPT

## 2025-08-05 PROCEDURE — G0463: CPT

## 2025-08-13 ENCOUNTER — APPOINTMENT (OUTPATIENT)
Dept: HOME HEALTH SERVICES | Facility: HOME HEALTH | Age: 89
End: 2025-08-13
Payer: MEDICARE

## 2025-08-13 VITALS
TEMPERATURE: 97.1 F | RESPIRATION RATE: 17 BRPM | HEART RATE: 70 BPM | WEIGHT: 170 LBS | OXYGEN SATURATION: 99 % | HEIGHT: 62 IN | BODY MASS INDEX: 31.28 KG/M2

## 2025-08-13 VITALS — DIASTOLIC BLOOD PRESSURE: 70 MMHG | SYSTOLIC BLOOD PRESSURE: 120 MMHG

## 2025-08-13 DIAGNOSIS — K63.5 POLYP OF COLON: ICD-10-CM

## 2025-08-13 DIAGNOSIS — I50.32 CHRONIC DIASTOLIC (CONGESTIVE) HEART FAILURE: ICD-10-CM

## 2025-08-13 DIAGNOSIS — Z90.710 ACQUIRED ABSENCE OF BOTH CERVIX AND UTERUS: ICD-10-CM

## 2025-08-13 DIAGNOSIS — I35.0 NONRHEUMATIC AORTIC (VALVE) STENOSIS: ICD-10-CM

## 2025-08-13 DIAGNOSIS — I89.0 LYMPHEDEMA, NOT ELSEWHERE CLASSIFIED: ICD-10-CM

## 2025-08-13 DIAGNOSIS — E11.628 TYPE 2 DIABETES MELLITUS WITH OTHER SKIN COMPLICATIONS: ICD-10-CM

## 2025-08-13 DIAGNOSIS — Z87.828 PERSONAL HISTORY OF OTHER (HEALED) PHYSICAL INJURY AND TRAUMA: ICD-10-CM

## 2025-08-13 DIAGNOSIS — E66.811 OBESITY, CLASS 1: ICD-10-CM

## 2025-08-13 DIAGNOSIS — I87.333 CHRONIC VENOUS HYPERTENSION (IDIOPATHIC) WITH ULCER AND INFLAMMATION OF BILATERAL LOWER EXTREMITY: ICD-10-CM

## 2025-08-13 DIAGNOSIS — L85.3 XEROSIS CUTIS: ICD-10-CM

## 2025-08-13 PROCEDURE — 99344 HOME/RES VST NEW MOD MDM 60: CPT

## 2025-08-13 RX ORDER — ACETAMINOPHEN 500 MG/1
500 TABLET, COATED ORAL
Refills: 0 | Status: ACTIVE | COMMUNITY
Start: 2025-08-13

## 2025-08-13 RX ORDER — CHOLECALCIFEROL (VITAMIN D3) 25 MCG
25 MCG TABLET,CHEWABLE ORAL
Refills: 0 | Status: ACTIVE | COMMUNITY
Start: 2025-08-13

## 2025-09-11 ENCOUNTER — NON-APPOINTMENT (OUTPATIENT)
Age: 89
End: 2025-09-11

## 2025-09-11 DIAGNOSIS — M77.50 OTHER ENTHESOPATHY OF UNSPCFD FOOT AND ANKLE: ICD-10-CM

## 2025-09-11 DIAGNOSIS — B35.1 TINEA UNGUIUM: ICD-10-CM

## 2025-09-11 DIAGNOSIS — M72.2 PLANTAR FASCIAL FIBROMATOSIS: ICD-10-CM

## 2025-09-11 DIAGNOSIS — S93.602A UNSPECIFIED SPRAIN OF LEFT FOOT, INITIAL ENCOUNTER: ICD-10-CM

## 2025-09-16 ENCOUNTER — APPOINTMENT (OUTPATIENT)
Dept: WOUND CARE | Facility: HOSPITAL | Age: 89
End: 2025-09-16
Payer: MEDICARE

## 2025-09-16 DIAGNOSIS — I89.0 LYMPHEDEMA, NOT ELSEWHERE CLASSIFIED: ICD-10-CM

## 2025-09-16 PROCEDURE — 99214 OFFICE O/P EST MOD 30 MIN: CPT

## 2025-09-22 PROBLEM — M79.672 LEFT FOOT PAIN: Status: ACTIVE | Noted: 2025-09-22

## 2025-09-24 PROBLEM — M79.674 PAIN IN TOES OF BOTH FEET: Status: ACTIVE | Noted: 2025-09-24

## 2025-09-24 PROBLEM — B35.1 ONYCHOMYCOSIS DUE TO TRICHOPHYTON RUBRUM: Status: ACTIVE | Noted: 2025-09-24
